# Patient Record
Sex: FEMALE | Race: WHITE | Employment: UNEMPLOYED | URBAN - METROPOLITAN AREA
[De-identification: names, ages, dates, MRNs, and addresses within clinical notes are randomized per-mention and may not be internally consistent; named-entity substitution may affect disease eponyms.]

---

## 2017-04-04 DIAGNOSIS — N18.1 CRF (CHRONIC RENAL FAILURE), STAGE 1: ICD-10-CM

## 2017-04-04 DIAGNOSIS — E78.1 HYPERTRIGLYCERIDEMIA: ICD-10-CM

## 2017-04-04 DIAGNOSIS — E11.65 TYPE 2 DIABETES MELLITUS WITH HYPERGLYCEMIA (HCC): ICD-10-CM

## 2017-04-04 DIAGNOSIS — E11.65 TYPE 2 DIABETES MELLITUS WITH HYPERGLYCEMIA, WITHOUT LONG-TERM CURRENT USE OF INSULIN (HCC): ICD-10-CM

## 2017-04-04 DIAGNOSIS — I10 ESSENTIAL HYPERTENSION: ICD-10-CM

## 2017-04-04 DIAGNOSIS — J45.30 MILD PERSISTENT ASTHMA WITHOUT COMPLICATION: ICD-10-CM

## 2017-04-04 RX ORDER — LISINOPRIL 40 MG/1
TABLET ORAL
Qty: 90 TABLET | Refills: 1 | Status: SHIPPED | OUTPATIENT
Start: 2017-04-04 | End: 2017-07-31 | Stop reason: SDUPTHER

## 2017-04-04 RX ORDER — SPIRONOLACTONE 25 MG/1
TABLET ORAL
Qty: 90 TABLET | Refills: 1 | Status: SHIPPED | OUTPATIENT
Start: 2017-04-04 | End: 2017-07-31 | Stop reason: SDUPTHER

## 2017-04-04 RX ORDER — SAXAGLIPTIN 5 MG/1
TABLET, FILM COATED ORAL
Qty: 30 TABLET | Refills: 4 | Status: SHIPPED | OUTPATIENT
Start: 2017-04-04 | End: 2017-07-31 | Stop reason: SDUPTHER

## 2017-07-31 ENCOUNTER — HOSPITAL ENCOUNTER (OUTPATIENT)
Age: 52
Setting detail: SPECIMEN
Discharge: HOME | End: 2017-07-31
Payer: COMMERCIAL

## 2017-07-31 ENCOUNTER — OFFICE VISIT (OUTPATIENT)
Dept: FAMILY MEDICINE CLINIC | Age: 52
End: 2017-07-31
Payer: COMMERCIAL

## 2017-07-31 VITALS
DIASTOLIC BLOOD PRESSURE: 82 MMHG | BODY MASS INDEX: 36.62 KG/M2 | HEIGHT: 62 IN | HEART RATE: 96 BPM | WEIGHT: 199 LBS | SYSTOLIC BLOOD PRESSURE: 123 MMHG | RESPIRATION RATE: 16 BRPM

## 2017-07-31 DIAGNOSIS — Z11.59 NEED FOR HEPATITIS C SCREENING TEST: ICD-10-CM

## 2017-07-31 DIAGNOSIS — Z12.11 SCREENING FOR COLON CANCER: ICD-10-CM

## 2017-07-31 DIAGNOSIS — E11.65 TYPE 2 DIABETES MELLITUS WITH HYPERGLYCEMIA, UNSPECIFIED LONG TERM INSULIN USE STATUS: ICD-10-CM

## 2017-07-31 DIAGNOSIS — E11.65 TYPE 2 DIABETES MELLITUS WITH HYPERGLYCEMIA, UNSPECIFIED LONG TERM INSULIN USE STATUS: Primary | ICD-10-CM

## 2017-07-31 DIAGNOSIS — Z12.31 SCREENING MAMMOGRAM, ENCOUNTER FOR: ICD-10-CM

## 2017-07-31 DIAGNOSIS — E11.65 TYPE 2 DIABETES MELLITUS WITH HYPERGLYCEMIA, WITHOUT LONG-TERM CURRENT USE OF INSULIN (HCC): ICD-10-CM

## 2017-07-31 DIAGNOSIS — E55.9 VITAMIN D DEFICIENCY: ICD-10-CM

## 2017-07-31 DIAGNOSIS — I10 ESSENTIAL HYPERTENSION: ICD-10-CM

## 2017-07-31 DIAGNOSIS — E78.1 HYPERTRIGLYCERIDEMIA: ICD-10-CM

## 2017-07-31 DIAGNOSIS — J45.40 MODERATE PERSISTENT ASTHMA WITHOUT COMPLICATION: ICD-10-CM

## 2017-07-31 DIAGNOSIS — Z13.820 SCREENING FOR OSTEOPOROSIS: ICD-10-CM

## 2017-07-31 DIAGNOSIS — Z11.4 SCREENING FOR HIV (HUMAN IMMUNODEFICIENCY VIRUS): ICD-10-CM

## 2017-07-31 DIAGNOSIS — J45.30 MILD PERSISTENT ASTHMA WITHOUT COMPLICATION: ICD-10-CM

## 2017-07-31 DIAGNOSIS — E78.2 MIXED HYPERLIPIDEMIA: ICD-10-CM

## 2017-07-31 DIAGNOSIS — N18.1 CRF (CHRONIC RENAL FAILURE), STAGE 1: ICD-10-CM

## 2017-07-31 DIAGNOSIS — N18.2 CRF (CHRONIC RENAL FAILURE), STAGE 2 (MILD): ICD-10-CM

## 2017-07-31 DIAGNOSIS — B37.9 CANDIDA INFECTION: ICD-10-CM

## 2017-07-31 LAB
CREATININE URINE: 66.8 MG/DL (ref 28–217)
HBA1C MFR BLD: 7.7 %
MICROALBUMIN/CREAT 24H UR: <12 MG/L
MICROALBUMIN/CREAT UR-RTO: 18 MCG/MG CREAT

## 2017-07-31 PROCEDURE — 99215 OFFICE O/P EST HI 40 MIN: CPT | Performed by: FAMILY MEDICINE

## 2017-07-31 PROCEDURE — 83036 HEMOGLOBIN GLYCOSYLATED A1C: CPT | Performed by: FAMILY MEDICINE

## 2017-07-31 RX ORDER — LINAGLIPTIN 5 MG/1
5 TABLET, FILM COATED ORAL DAILY
Qty: 90 TABLET | Refills: 1 | Status: SHIPPED | OUTPATIENT
Start: 2017-07-31 | End: 2018-02-12 | Stop reason: SDUPTHER

## 2017-07-31 RX ORDER — SPIRONOLACTONE 25 MG/1
25 TABLET ORAL DAILY
Qty: 90 TABLET | Refills: 1 | Status: SHIPPED | OUTPATIENT
Start: 2017-07-31 | End: 2018-05-01 | Stop reason: SDUPTHER

## 2017-07-31 RX ORDER — BUMETANIDE 1 MG/1
0.5 TABLET ORAL DAILY
Qty: 90 TABLET | Refills: 1 | Status: SHIPPED | OUTPATIENT
Start: 2017-07-31 | End: 2018-06-26 | Stop reason: SDUPTHER

## 2017-07-31 RX ORDER — DAPAGLIFLOZIN 10 MG/1
10 TABLET, FILM COATED ORAL EVERY MORNING
Qty: 90 TABLET | Refills: 1 | Status: SHIPPED | OUTPATIENT
Start: 2017-07-31 | End: 2018-02-12 | Stop reason: SDUPTHER

## 2017-07-31 RX ORDER — FENOFIBRATE 134 MG/1
134 CAPSULE ORAL
Qty: 90 CAPSULE | Refills: 1 | Status: SHIPPED | OUTPATIENT
Start: 2017-07-31 | End: 2018-01-27 | Stop reason: SDUPTHER

## 2017-07-31 RX ORDER — DULAGLUTIDE 1.5 MG/.5ML
1.5 INJECTION, SOLUTION SUBCUTANEOUS WEEKLY
Qty: 12 PEN | Refills: 1 | Status: SHIPPED | OUTPATIENT
Start: 2017-07-31 | End: 2018-01-09 | Stop reason: SDUPTHER

## 2017-07-31 RX ORDER — FLUCONAZOLE 150 MG/1
TABLET ORAL
Qty: 30 TABLET | Refills: 1 | Status: SHIPPED | OUTPATIENT
Start: 2017-07-31 | End: 2017-08-07 | Stop reason: SDUPTHER

## 2017-07-31 RX ORDER — LISINOPRIL 40 MG/1
40 TABLET ORAL DAILY
Qty: 90 TABLET | Refills: 1 | Status: SHIPPED | OUTPATIENT
Start: 2017-07-31 | End: 2018-05-01 | Stop reason: SDUPTHER

## 2017-08-07 ENCOUNTER — TELEPHONE (OUTPATIENT)
Dept: FAMILY MEDICINE CLINIC | Age: 52
End: 2017-08-07

## 2017-08-07 DIAGNOSIS — B37.9 CANDIDA INFECTION: ICD-10-CM

## 2017-08-07 RX ORDER — FLUCONAZOLE 150 MG/1
TABLET ORAL
Qty: 30 TABLET | Refills: 1 | Status: SHIPPED | OUTPATIENT
Start: 2017-08-07 | End: 2018-10-18

## 2017-08-11 DIAGNOSIS — E11.65 TYPE 2 DIABETES MELLITUS WITH HYPERGLYCEMIA, WITHOUT LONG-TERM CURRENT USE OF INSULIN (HCC): ICD-10-CM

## 2017-08-11 RX ORDER — DAPAGLIFLOZIN 10 MG/1
TABLET, FILM COATED ORAL
Qty: 90 TABLET | Refills: 0 | OUTPATIENT
Start: 2017-08-11

## 2017-08-12 LAB
CHOLESTEROL, TOTAL: 147 MG/DL
CHOLESTEROL/HDL RATIO: 6.4
HDLC SERPL-MCNC: 23 MG/DL (ref 35–70)
LDL CHOLESTEROL CALCULATED: 70 MG/DL (ref 0–160)
TRIGL SERPL-MCNC: 268 MG/DL
VLDLC SERPL CALC-MCNC: 54 MG/DL

## 2017-08-14 DIAGNOSIS — E78.2 MIXED HYPERLIPIDEMIA: ICD-10-CM

## 2017-08-14 DIAGNOSIS — Z11.4 SCREENING FOR HIV (HUMAN IMMUNODEFICIENCY VIRUS): ICD-10-CM

## 2017-08-14 DIAGNOSIS — J45.40 MODERATE PERSISTENT ASTHMA WITHOUT COMPLICATION: ICD-10-CM

## 2017-08-14 DIAGNOSIS — E78.1 HYPERTRIGLYCERIDEMIA: ICD-10-CM

## 2017-08-14 DIAGNOSIS — E11.65 TYPE 2 DIABETES MELLITUS WITH HYPERGLYCEMIA, UNSPECIFIED LONG TERM INSULIN USE STATUS: ICD-10-CM

## 2017-08-14 DIAGNOSIS — I10 ESSENTIAL HYPERTENSION: ICD-10-CM

## 2017-08-14 DIAGNOSIS — N18.2 CRF (CHRONIC RENAL FAILURE), STAGE 2 (MILD): ICD-10-CM

## 2017-08-14 DIAGNOSIS — E55.9 VITAMIN D DEFICIENCY: ICD-10-CM

## 2017-08-14 LAB
ALBUMIN SERPL-MCNC: NORMAL G/DL
ALP BLD-CCNC: NORMAL U/L
ALT SERPL-CCNC: NORMAL U/L
ANION GAP SERPL CALCULATED.3IONS-SCNC: NORMAL MMOL/L
AST SERPL-CCNC: NORMAL U/L
BASOPHILS ABSOLUTE: NORMAL /ΜL
BASOPHILS RELATIVE PERCENT: NORMAL %
BILIRUB SERPL-MCNC: NORMAL MG/DL (ref 0.1–1.4)
BUN BLDV-MCNC: NORMAL MG/DL
CALCIUM SERPL-MCNC: NORMAL MG/DL
CHLORIDE BLD-SCNC: NORMAL MMOL/L
CO2: NORMAL MMOL/L
CREAT SERPL-MCNC: NORMAL MG/DL
EOSINOPHILS ABSOLUTE: NORMAL /ΜL
EOSINOPHILS RELATIVE PERCENT: NORMAL %
GFR CALCULATED: NORMAL
GLUCOSE BLD-MCNC: NORMAL MG/DL
HCT VFR BLD CALC: NORMAL % (ref 36–46)
HEMOGLOBIN: NORMAL G/DL (ref 12–16)
LYMPHOCYTES ABSOLUTE: NORMAL /ΜL
LYMPHOCYTES RELATIVE PERCENT: NORMAL %
MCH RBC QN AUTO: NORMAL PG
MCHC RBC AUTO-ENTMCNC: NORMAL G/DL
MCV RBC AUTO: NORMAL FL
MONOCYTES ABSOLUTE: NORMAL /ΜL
MONOCYTES RELATIVE PERCENT: NORMAL %
NEUTROPHILS ABSOLUTE: NORMAL /ΜL
NEUTROPHILS RELATIVE PERCENT: NORMAL %
PDW BLD-RTO: NORMAL %
PLATELET # BLD: NORMAL K/ΜL
PMV BLD AUTO: NORMAL FL
POTASSIUM SERPL-SCNC: NORMAL MMOL/L
RBC # BLD: NORMAL 10^6/ΜL
SODIUM BLD-SCNC: NORMAL MMOL/L
T4 FREE: NORMAL
TOTAL PROTEIN: NORMAL
TSH SERPL DL<=0.05 MIU/L-ACNC: NORMAL UIU/ML
WBC # BLD: NORMAL 10^3/ML

## 2017-08-15 DIAGNOSIS — Z11.59 NEED FOR HEPATITIS C SCREENING TEST: ICD-10-CM

## 2018-01-29 RX ORDER — FENOFIBRATE 134 MG/1
CAPSULE ORAL
Qty: 90 CAPSULE | Refills: 1 | Status: SHIPPED | OUTPATIENT
Start: 2018-01-29 | End: 2018-07-28 | Stop reason: SDUPTHER

## 2018-05-19 DIAGNOSIS — E11.65 TYPE 2 DIABETES MELLITUS WITH HYPERGLYCEMIA, WITHOUT LONG-TERM CURRENT USE OF INSULIN (HCC): ICD-10-CM

## 2018-05-19 DIAGNOSIS — E11.65 TYPE 2 DIABETES MELLITUS WITH HYPERGLYCEMIA, UNSPECIFIED LONG TERM INSULIN USE STATUS: ICD-10-CM

## 2018-05-21 RX ORDER — DAPAGLIFLOZIN 10 MG/1
TABLET, FILM COATED ORAL
Qty: 90 TABLET | Refills: 0 | Status: SHIPPED | OUTPATIENT
Start: 2018-05-21 | End: 2018-08-03 | Stop reason: SDUPTHER

## 2018-06-15 DIAGNOSIS — E11.65 TYPE 2 DIABETES MELLITUS WITH HYPERGLYCEMIA, UNSPECIFIED LONG TERM INSULIN USE STATUS: ICD-10-CM

## 2018-06-15 DIAGNOSIS — E11.65 TYPE 2 DIABETES MELLITUS WITH HYPERGLYCEMIA, WITHOUT LONG-TERM CURRENT USE OF INSULIN (HCC): ICD-10-CM

## 2018-06-18 RX ORDER — LINAGLIPTIN 5 MG/1
TABLET, FILM COATED ORAL
Qty: 90 TABLET | Refills: 0 | Status: SHIPPED | OUTPATIENT
Start: 2018-06-18 | End: 2018-09-19 | Stop reason: SDUPTHER

## 2018-06-26 DIAGNOSIS — I10 ESSENTIAL HYPERTENSION: ICD-10-CM

## 2018-06-26 DIAGNOSIS — N18.1 CRF (CHRONIC RENAL FAILURE), STAGE 1: ICD-10-CM

## 2018-06-26 DIAGNOSIS — E78.1 HYPERTRIGLYCERIDEMIA: ICD-10-CM

## 2018-06-26 DIAGNOSIS — J45.30 MILD PERSISTENT ASTHMA WITHOUT COMPLICATION: ICD-10-CM

## 2018-06-26 RX ORDER — BUMETANIDE 1 MG/1
TABLET ORAL
Qty: 90 TABLET | Refills: 1 | Status: SHIPPED | OUTPATIENT
Start: 2018-06-26 | End: 2018-10-18 | Stop reason: SDUPTHER

## 2018-07-30 RX ORDER — FENOFIBRATE 134 MG/1
CAPSULE ORAL
Qty: 90 CAPSULE | Refills: 1 | Status: SHIPPED | OUTPATIENT
Start: 2018-07-30 | End: 2018-10-18 | Stop reason: SDUPTHER

## 2018-07-30 NOTE — TELEPHONE ENCOUNTER
Medication: Fenofibrate 134mg  Last visit: 07/31/17  Next visit: Visit date not found  Last refill: 01/29/18  Pharmacy: [facility]  PCP:           Homer Schulz DO

## 2018-08-03 DIAGNOSIS — E78.1 HYPERTRIGLYCERIDEMIA: ICD-10-CM

## 2018-08-03 DIAGNOSIS — J45.30 MILD PERSISTENT ASTHMA WITHOUT COMPLICATION: ICD-10-CM

## 2018-08-03 DIAGNOSIS — E11.65 TYPE 2 DIABETES MELLITUS WITH HYPERGLYCEMIA, WITHOUT LONG-TERM CURRENT USE OF INSULIN (HCC): ICD-10-CM

## 2018-08-03 DIAGNOSIS — N18.1 CRF (CHRONIC RENAL FAILURE), STAGE 1: ICD-10-CM

## 2018-08-03 DIAGNOSIS — E11.65 TYPE 2 DIABETES MELLITUS WITH HYPERGLYCEMIA, UNSPECIFIED LONG TERM INSULIN USE STATUS: ICD-10-CM

## 2018-08-03 DIAGNOSIS — I10 ESSENTIAL HYPERTENSION: ICD-10-CM

## 2018-08-03 RX ORDER — LISINOPRIL 40 MG/1
TABLET ORAL
Qty: 90 TABLET | Refills: 0 | Status: SHIPPED | OUTPATIENT
Start: 2018-08-03 | End: 2018-10-18 | Stop reason: SDUPTHER

## 2018-08-03 RX ORDER — SPIRONOLACTONE 25 MG/1
TABLET ORAL
Qty: 90 TABLET | Refills: 0 | Status: SHIPPED | OUTPATIENT
Start: 2018-08-03 | End: 2018-10-18 | Stop reason: SDUPTHER

## 2018-08-03 RX ORDER — DAPAGLIFLOZIN 10 MG/1
TABLET, FILM COATED ORAL
Qty: 90 TABLET | Refills: 0 | Status: SHIPPED | OUTPATIENT
Start: 2018-08-03 | End: 2018-10-18 | Stop reason: SDUPTHER

## 2018-08-03 RX ORDER — DULAGLUTIDE 1.5 MG/.5ML
1.5 INJECTION, SOLUTION SUBCUTANEOUS WEEKLY
Qty: 6 ML | Refills: 0 | Status: SHIPPED | OUTPATIENT
Start: 2018-08-03 | End: 2018-10-18 | Stop reason: SDUPTHER

## 2018-09-19 DIAGNOSIS — E11.65 TYPE 2 DIABETES MELLITUS WITH HYPERGLYCEMIA, WITHOUT LONG-TERM CURRENT USE OF INSULIN (HCC): ICD-10-CM

## 2018-09-19 DIAGNOSIS — E11.65 TYPE 2 DIABETES MELLITUS WITH HYPERGLYCEMIA (HCC): ICD-10-CM

## 2018-09-20 RX ORDER — LINAGLIPTIN 5 MG/1
TABLET, FILM COATED ORAL
Qty: 90 TABLET | Refills: 0 | Status: SHIPPED | OUTPATIENT
Start: 2018-09-20 | End: 2018-12-30 | Stop reason: SDUPTHER

## 2018-10-18 ENCOUNTER — OFFICE VISIT (OUTPATIENT)
Dept: FAMILY MEDICINE CLINIC | Age: 53
End: 2018-10-18
Payer: COMMERCIAL

## 2018-10-18 VITALS
SYSTOLIC BLOOD PRESSURE: 116 MMHG | HEIGHT: 62 IN | HEART RATE: 80 BPM | RESPIRATION RATE: 16 BRPM | BODY MASS INDEX: 32.94 KG/M2 | WEIGHT: 179 LBS | DIASTOLIC BLOOD PRESSURE: 71 MMHG

## 2018-10-18 DIAGNOSIS — E78.1 HYPERTRIGLYCERIDEMIA: ICD-10-CM

## 2018-10-18 DIAGNOSIS — Z12.11 SCREENING FOR COLON CANCER: ICD-10-CM

## 2018-10-18 DIAGNOSIS — I10 ESSENTIAL HYPERTENSION: ICD-10-CM

## 2018-10-18 DIAGNOSIS — Z23 NEED FOR INFLUENZA VACCINATION: ICD-10-CM

## 2018-10-18 DIAGNOSIS — J45.30 MILD PERSISTENT ASTHMA WITHOUT COMPLICATION: ICD-10-CM

## 2018-10-18 DIAGNOSIS — Z12.39 SCREENING FOR BREAST CANCER: ICD-10-CM

## 2018-10-18 DIAGNOSIS — Z23 NEEDS FLU SHOT: ICD-10-CM

## 2018-10-18 DIAGNOSIS — N18.1 CRF (CHRONIC RENAL FAILURE), STAGE 1: ICD-10-CM

## 2018-10-18 DIAGNOSIS — E11.65 TYPE 2 DIABETES MELLITUS WITH HYPERGLYCEMIA, WITHOUT LONG-TERM CURRENT USE OF INSULIN (HCC): Primary | ICD-10-CM

## 2018-10-18 LAB — HBA1C MFR BLD: 5.9 %

## 2018-10-18 PROCEDURE — 90471 IMMUNIZATION ADMIN: CPT | Performed by: FAMILY MEDICINE

## 2018-10-18 PROCEDURE — 83036 HEMOGLOBIN GLYCOSYLATED A1C: CPT | Performed by: FAMILY MEDICINE

## 2018-10-18 PROCEDURE — 99214 OFFICE O/P EST MOD 30 MIN: CPT | Performed by: FAMILY MEDICINE

## 2018-10-18 PROCEDURE — 90686 IIV4 VACC NO PRSV 0.5 ML IM: CPT | Performed by: FAMILY MEDICINE

## 2018-10-18 RX ORDER — BUMETANIDE 1 MG/1
TABLET ORAL
Qty: 90 TABLET | Refills: 1 | Status: SHIPPED | OUTPATIENT
Start: 2018-10-18 | End: 2020-02-25

## 2018-10-18 RX ORDER — DULAGLUTIDE 1.5 MG/.5ML
1.5 INJECTION, SOLUTION SUBCUTANEOUS WEEKLY
Qty: 6 ML | Refills: 3 | Status: SHIPPED | OUTPATIENT
Start: 2018-10-18 | End: 2020-01-21

## 2018-10-18 RX ORDER — FENOFIBRATE 134 MG/1
CAPSULE ORAL
Qty: 90 CAPSULE | Refills: 1 | Status: SHIPPED | OUTPATIENT
Start: 2018-10-18 | End: 2019-04-16 | Stop reason: SDUPTHER

## 2018-10-18 RX ORDER — LISINOPRIL 20 MG/1
TABLET ORAL
Qty: 90 TABLET | Refills: 3 | Status: SHIPPED | OUTPATIENT
Start: 2018-10-18 | End: 2019-06-06 | Stop reason: SINTOL

## 2018-10-18 RX ORDER — DAPAGLIFLOZIN 10 MG/1
TABLET, FILM COATED ORAL
Qty: 90 TABLET | Refills: 1 | Status: SHIPPED | OUTPATIENT
Start: 2018-10-18 | End: 2019-06-06

## 2018-10-18 RX ORDER — SPIRONOLACTONE 25 MG/1
TABLET ORAL
Qty: 90 TABLET | Refills: 1 | Status: SHIPPED | OUTPATIENT
Start: 2018-10-18 | End: 2019-05-24 | Stop reason: SDUPTHER

## 2018-10-18 RX ORDER — LISINOPRIL 40 MG/1
TABLET ORAL
Qty: 90 TABLET | Refills: 1 | Status: SHIPPED | OUTPATIENT
Start: 2018-10-18 | End: 2018-10-18 | Stop reason: SDUPTHER

## 2018-10-18 ASSESSMENT — PATIENT HEALTH QUESTIONNAIRE - PHQ9
1. LITTLE INTEREST OR PLEASURE IN DOING THINGS: 0
SUM OF ALL RESPONSES TO PHQ QUESTIONS 1-9: 0
SUM OF ALL RESPONSES TO PHQ QUESTIONS 1-9: 0
2. FEELING DOWN, DEPRESSED OR HOPELESS: 0
SUM OF ALL RESPONSES TO PHQ9 QUESTIONS 1 & 2: 0

## 2018-10-18 NOTE — PROGRESS NOTES
Encounter   Medications    TRULICITY 1.5 GS/2.8QI SOPN     Sig: Inject 1.5 mg into the skin once a week     Dispense:  6 mL     Refill:  3    FARXIGA 10 MG tablet     Sig: TAKE 1 TABLET EVERY MORNING     Dispense:  90 tablet     Refill:  1    lisinopril (PRINIVIL;ZESTRIL) 40 MG tablet     Sig: TAKE 1 TABLET DAILY     Dispense:  90 tablet     Refill:  1    spironolactone (ALDACTONE) 25 MG tablet     Sig: TAKE 1 TABLET DAILY     Dispense:  90 tablet     Refill:  1    fenofibrate micronized (LOFIBRA) 134 MG capsule     Sig: TAKE 1 CAPSULE EVERY MORNING BEFORE BREAKFAST     Dispense:  90 capsule     Refill:  1    bumetanide (BUMEX) 1 MG tablet     Sig: TAKE ONE-HALF (1/2) TABLET DAILY     Dispense:  90 tablet     Refill:  1       Electronically signed by Karma Stratton DO on 10/18/2018 at 11:18 AM

## 2018-12-30 DIAGNOSIS — E11.65 TYPE 2 DIABETES MELLITUS WITH HYPERGLYCEMIA (HCC): ICD-10-CM

## 2018-12-30 DIAGNOSIS — E11.65 TYPE 2 DIABETES MELLITUS WITH HYPERGLYCEMIA, WITHOUT LONG-TERM CURRENT USE OF INSULIN (HCC): ICD-10-CM

## 2018-12-31 RX ORDER — LINAGLIPTIN 5 MG/1
TABLET, FILM COATED ORAL
Qty: 90 TABLET | Refills: 0 | Status: SHIPPED | OUTPATIENT
Start: 2018-12-31 | End: 2019-03-26 | Stop reason: SDUPTHER

## 2019-03-26 DIAGNOSIS — E11.65 TYPE 2 DIABETES MELLITUS WITH HYPERGLYCEMIA (HCC): ICD-10-CM

## 2019-03-26 DIAGNOSIS — E11.65 TYPE 2 DIABETES MELLITUS WITH HYPERGLYCEMIA, WITHOUT LONG-TERM CURRENT USE OF INSULIN (HCC): ICD-10-CM

## 2019-03-26 RX ORDER — LINAGLIPTIN 5 MG/1
TABLET, FILM COATED ORAL
Qty: 90 TABLET | Refills: 0 | Status: SHIPPED | OUTPATIENT
Start: 2019-03-26 | End: 2019-06-06

## 2019-04-16 DIAGNOSIS — J45.30 MILD PERSISTENT ASTHMA WITHOUT COMPLICATION: ICD-10-CM

## 2019-04-16 DIAGNOSIS — E78.1 HYPERTRIGLYCERIDEMIA: ICD-10-CM

## 2019-04-18 RX ORDER — FENOFIBRATE 134 MG/1
CAPSULE ORAL
Qty: 90 CAPSULE | Refills: 1 | Status: SHIPPED | OUTPATIENT
Start: 2019-04-18 | End: 2019-10-13 | Stop reason: SDUPTHER

## 2019-05-24 DIAGNOSIS — N18.1 CRF (CHRONIC RENAL FAILURE), STAGE 1: ICD-10-CM

## 2019-05-24 DIAGNOSIS — I10 ESSENTIAL HYPERTENSION: ICD-10-CM

## 2019-05-24 DIAGNOSIS — J45.30 MILD PERSISTENT ASTHMA WITHOUT COMPLICATION: ICD-10-CM

## 2019-05-24 RX ORDER — SPIRONOLACTONE 25 MG/1
TABLET ORAL
Qty: 90 TABLET | Refills: 1 | Status: SHIPPED | OUTPATIENT
Start: 2019-05-24 | End: 2020-01-21

## 2019-06-06 ENCOUNTER — OFFICE VISIT (OUTPATIENT)
Dept: FAMILY MEDICINE CLINIC | Age: 54
End: 2019-06-06
Payer: COMMERCIAL

## 2019-06-06 VITALS
SYSTOLIC BLOOD PRESSURE: 133 MMHG | BODY MASS INDEX: 30.55 KG/M2 | WEIGHT: 166 LBS | HEIGHT: 62 IN | RESPIRATION RATE: 16 BRPM | HEART RATE: 97 BPM | DIASTOLIC BLOOD PRESSURE: 76 MMHG

## 2019-06-06 DIAGNOSIS — E11.65 TYPE 2 DIABETES MELLITUS WITH HYPERGLYCEMIA, WITHOUT LONG-TERM CURRENT USE OF INSULIN (HCC): Primary | ICD-10-CM

## 2019-06-06 DIAGNOSIS — Z23 NEED FOR TDAP VACCINATION: ICD-10-CM

## 2019-06-06 DIAGNOSIS — J45.30 MILD PERSISTENT ASTHMA WITHOUT COMPLICATION: ICD-10-CM

## 2019-06-06 DIAGNOSIS — Z12.11 ENCOUNTER FOR SCREENING COLONOSCOPY: ICD-10-CM

## 2019-06-06 DIAGNOSIS — Z12.39 SCREENING FOR BREAST CANCER: ICD-10-CM

## 2019-06-06 DIAGNOSIS — Z12.11 SCREENING FOR COLON CANCER: ICD-10-CM

## 2019-06-06 DIAGNOSIS — Z23 NEED FOR VACCINATION WITH 13-POLYVALENT PNEUMOCOCCAL CONJUGATE VACCINE: ICD-10-CM

## 2019-06-06 DIAGNOSIS — N18.1 CRF (CHRONIC RENAL FAILURE), STAGE 1: ICD-10-CM

## 2019-06-06 DIAGNOSIS — E78.1 HYPERTRIGLYCERIDEMIA: ICD-10-CM

## 2019-06-06 DIAGNOSIS — I10 ESSENTIAL HYPERTENSION: ICD-10-CM

## 2019-06-06 DIAGNOSIS — M79.641 HAND PAIN, RIGHT: ICD-10-CM

## 2019-06-06 LAB — HBA1C MFR BLD: 5.5 %

## 2019-06-06 PROCEDURE — 83036 HEMOGLOBIN GLYCOSYLATED A1C: CPT | Performed by: FAMILY MEDICINE

## 2019-06-06 PROCEDURE — 90670 PCV13 VACCINE IM: CPT | Performed by: FAMILY MEDICINE

## 2019-06-06 PROCEDURE — 90471 IMMUNIZATION ADMIN: CPT | Performed by: FAMILY MEDICINE

## 2019-06-06 PROCEDURE — 90472 IMMUNIZATION ADMIN EACH ADD: CPT | Performed by: FAMILY MEDICINE

## 2019-06-06 PROCEDURE — 90715 TDAP VACCINE 7 YRS/> IM: CPT | Performed by: FAMILY MEDICINE

## 2019-06-06 PROCEDURE — 99214 OFFICE O/P EST MOD 30 MIN: CPT | Performed by: FAMILY MEDICINE

## 2019-06-06 RX ORDER — LISINOPRIL 10 MG/1
10 TABLET ORAL DAILY
Qty: 90 TABLET | Refills: 3 | Status: SHIPPED | OUTPATIENT
Start: 2019-06-06 | End: 2020-08-10

## 2019-06-06 ASSESSMENT — PATIENT HEALTH QUESTIONNAIRE - PHQ9
SUM OF ALL RESPONSES TO PHQ9 QUESTIONS 1 & 2: 0
1. LITTLE INTEREST OR PLEASURE IN DOING THINGS: 0
SUM OF ALL RESPONSES TO PHQ QUESTIONS 1-9: 0
2. FEELING DOWN, DEPRESSED OR HOPELESS: 0
SUM OF ALL RESPONSES TO PHQ QUESTIONS 1-9: 0

## 2019-06-06 NOTE — PROGRESS NOTES
Allergic/Immunologic: Negative. Neurological: Negative. Negative for dizziness, weakness and numbness. Hematological: Negative. Negative for adenopathy. Does not bruise/bleed easily. Psychiatric/Behavioral: Negative for sleep disturbance, dysphoric mood and  decreased concentration. The patient is not nervous/anxious. Objective:     Physical Exam:     Nursing note and vitals reviewed. /76   Pulse 97   Resp 16   Ht 5' 1.81\" (1.57 m)   Wt 166 lb (75.3 kg)   Breastfeeding? No   BMI 30.55 kg/m²   Constitutional: She is oriented to person, place, and time. She   appears well-developed and well-nourished. HENT:   Head: Normocephalic and atraumatic. Right Ear: External ear normal. Tympanic membrane is not erythematous. No middle ear effusion. Left Ear: External ear normal. Tympanic membrane is not erythematous. No middle ear effusion. Nose: No mucosal edema. Mouth/Throat: Oropharynx is clear and moist. No posterior oropharyngeal erythema. Eyes: Conjunctivae and EOM are normal. Pupils are equal, round, and reactive to light. Neck: Normal range of motion. Neck supple. No thyromegaly present. Cardiovascular: Normal rate, regular rhythm and normal heart sounds. No murmur heard. Pulmonary/Chest: Effort normal and breath sounds normal. She has no wheezes. Shehas no rales. Abdominal: Soft. Bowel sounds are normal. She exhibits no distension and no mass. There is no tenderness. There is no rebound and no guarding. Genitourinary/Anorectal:deferred  Musculoskeletal: Normal range of motion. She exhibits no edema or tenderness. Lymphadenopathy: She has no cervical adenopathy. Neurological: She is alert and oriented to person, place, and time. She has normal reflexes. Skin: Skin is warm and dry. No rash noted. Psychiatric: She has a normal mood and affect. Her   behavior is normal.       Assessment:      1.  Type 2 diabetes mellitus with hyperglycemia, without long-term lisinopril to 10 mg follow-up in the office in 6 months or when necessary given a T dap and a Prevnar 13    Electronically signed by Patience Crowley DO on 6/6/2019 at 4:31 PM

## 2019-10-13 DIAGNOSIS — E78.1 HYPERTRIGLYCERIDEMIA: ICD-10-CM

## 2019-10-13 DIAGNOSIS — J45.30 MILD PERSISTENT ASTHMA WITHOUT COMPLICATION: ICD-10-CM

## 2019-10-14 RX ORDER — FENOFIBRATE 134 MG/1
CAPSULE ORAL
Qty: 90 CAPSULE | Refills: 4 | Status: SHIPPED | OUTPATIENT
Start: 2019-10-14 | End: 2020-10-20

## 2020-01-21 RX ORDER — DULAGLUTIDE 1.5 MG/.5ML
INJECTION, SOLUTION SUBCUTANEOUS
Qty: 6 ML | Refills: 4 | Status: SHIPPED | OUTPATIENT
Start: 2020-01-21

## 2020-01-21 RX ORDER — SPIRONOLACTONE 25 MG/1
TABLET ORAL
Qty: 90 TABLET | Refills: 4 | Status: SHIPPED | OUTPATIENT
Start: 2020-01-21 | End: 2021-01-25

## 2020-01-23 ENCOUNTER — OFFICE VISIT (OUTPATIENT)
Dept: FAMILY MEDICINE CLINIC | Age: 55
End: 2020-01-23
Payer: COMMERCIAL

## 2020-01-23 VITALS
WEIGHT: 180.4 LBS | SYSTOLIC BLOOD PRESSURE: 137 MMHG | BODY MASS INDEX: 33.2 KG/M2 | HEIGHT: 62 IN | DIASTOLIC BLOOD PRESSURE: 85 MMHG | HEART RATE: 90 BPM | OXYGEN SATURATION: 97 %

## 2020-01-23 LAB
BILIRUBIN, POC: NORMAL
BLOOD URINE, POC: NORMAL
CLARITY, POC: CLEAR
COLOR, POC: YELLOW
CREATININE URINE POCT: 300
GLUCOSE URINE, POC: 500
HBA1C MFR BLD: 5.9 %
KETONES, POC: NORMAL
LEUKOCYTE EST, POC: NORMAL
MICROALBUMIN/CREAT 24H UR: 80 MG/G{CREAT}
MICROALBUMIN/CREAT UR-RTO: NORMAL
NITRITE, POC: NORMAL
PH, POC: 5.5
PROTEIN, POC: NORMAL
SPECIFIC GRAVITY, POC: >=1.03
UROBILINOGEN, POC: 0.2

## 2020-01-23 PROCEDURE — 90471 IMMUNIZATION ADMIN: CPT | Performed by: FAMILY MEDICINE

## 2020-01-23 PROCEDURE — 99214 OFFICE O/P EST MOD 30 MIN: CPT | Performed by: FAMILY MEDICINE

## 2020-01-23 PROCEDURE — 81002 URINALYSIS NONAUTO W/O SCOPE: CPT | Performed by: FAMILY MEDICINE

## 2020-01-23 PROCEDURE — 82044 UR ALBUMIN SEMIQUANTITATIVE: CPT | Performed by: FAMILY MEDICINE

## 2020-01-23 PROCEDURE — 90686 IIV4 VACC NO PRSV 0.5 ML IM: CPT | Performed by: FAMILY MEDICINE

## 2020-01-23 PROCEDURE — 83036 HEMOGLOBIN GLYCOSYLATED A1C: CPT | Performed by: FAMILY MEDICINE

## 2020-01-23 ASSESSMENT — PATIENT HEALTH QUESTIONNAIRE - PHQ9
SUM OF ALL RESPONSES TO PHQ9 QUESTIONS 1 & 2: 0
SUM OF ALL RESPONSES TO PHQ QUESTIONS 1-9: 0
2. FEELING DOWN, DEPRESSED OR HOPELESS: 0
SUM OF ALL RESPONSES TO PHQ QUESTIONS 1-9: 0
1. LITTLE INTEREST OR PLEASURE IN DOING THINGS: 0

## 2020-01-23 NOTE — PROGRESS NOTES
Test 2-3 times daily. Please dispense Entitlecan test strips #200 3 refills 200 each 3    ONETOUCH VERIO strip 1 each by In Vitro route daily As needed. 100 each 3    albuterol sulfate HFA (PROVENTIL HFA) 108 (90 BASE) MCG/ACT inhaler Inhale 2 puffs into the lungs every 6 hours as needed for Wheezing 3 Inhaler 3     No current facility-administered medications for this visit. ALLERGIES:  No Known Allergies    Social History     Tobacco Use    Smoking status: Current Every Day Smoker     Packs/day: 0.80     Years: 30.00     Pack years: 24.00     Types: Cigarettes    Smokeless tobacco: Never Used   Substance Use Topics    Alcohol use: No        LDL Calculated (mg/dL)   Date Value   08/12/2017 70     LDL Cholesterol (mg/dL)   Date Value   03/16/2013 68     HDL (mg/dL)   Date Value   08/12/2017 23 (A)     BUN (mg/dL)   Date Value   03/16/2013 24 (H)     CREATININE (mg/dL)   Date Value   03/16/2013 1.42 (H)     Glucose (mg/dL)   Date Value   12/22/2015 597   04/14/2012 105     Hemoglobin A1C (%)   Date Value   01/23/2020 5.9     Microalb/Crt. Ratio (mcg/mg creat)   Date Value   07/31/2017 18              Subjective:      HPI  Today for follow-up on her diabetes she is doing great her A1c today is 5.9. Unfortunately she did gain a little bit of weight back but overall she is doing good she is due for laboratory studies and needs refills    Review of Systems:     Constitutional: Negative for fever, appetite change and fatigue. Family social and medical history reviewed and unchanged     HENT: Negative. Negative for nosebleeds, trouble swallowing and neck pain. Eyes: Negative for photophobia and visual disturbance. Respiratory: Negative. Negative for chest tightness and shortness of breath. Cardiovascular: Negative. Negative for chest pain and leg swelling. Gastrointestinal: Negative. Negative for abdominal pain and blood in stool. Endocrine: Negative for cold intolerance and polyuria. Genitourinary: Negative for dysuria and hematuria. Musculoskeletal: Negative. Skin: Negative for rash. Allergic/Immunologic: Negative. Neurological: Negative. Negative for dizziness, weakness and numbness. Hematological: Negative. Negative for adenopathy. Does not bruise/bleed easily. Psychiatric/Behavioral: Negative for sleep disturbance, dysphoric mood and  decreased concentration. The patient is not nervous/anxious. Objective:     Physical Exam:     Nursing note and vitals reviewed. /85   Pulse 90   Ht 5' 1.75\" (1.568 m)   Wt 180 lb 6.4 oz (81.8 kg)   SpO2 97%   BMI 33.26 kg/m²   Constitutional: She is oriented to person, place, and time. She   appears well-developed and well-nourished. HENT:   Head: Normocephalic and atraumatic. Right Ear: External ear normal. Tympanic membrane is not erythematous. No middle ear effusion. Left Ear: External ear normal. Tympanic membrane is not erythematous. No middle ear effusion. Nose: No mucosal edema. Mouth/Throat: Oropharynx is clear and moist. No posterior oropharyngeal erythema. Eyes: Conjunctivae and EOM are normal. Pupils are equal, round, and reactive to light. Neck: Normal range of motion. Neck supple. No thyromegaly present. Cardiovascular: Normal rate, regular rhythm and normal heart sounds. No murmur heard. Pulmonary/Chest: Effort normal and breath sounds normal. She has no wheezes. Shehas no rales. Abdominal: Soft. Bowel sounds are normal. She exhibits no distension and no mass. There is no tenderness. There is no rebound and no guarding. Genitourinary/Anorectal:deferred  Musculoskeletal: Normal range of motion. She exhibits no edema or tenderness. Lymphadenopathy: She has no cervical adenopathy. Neurological: She is alert and oriented to person, place, and time. She has normal reflexes. Skin: Skin is warm and dry. No rash noted. Psychiatric: She has a normal mood and affect.  Her   behavior is normal.       Assessment:      1. Type 2 diabetes mellitus with hyperglycemia, unspecified whether long term insulin use ([facility])    2. Screening mammogram, encounter for    3. Type 2 diabetes mellitus with hyperglycemia, without long-term current use of insulin ([facility] [address]    4. Mild persistent asthma without complication    5. Essential hypertension    6. Hypertriglyceridemia    7. Screening for colon cancer    8. Well adult exam    9. Carpal tunnel syndrome on right    10. Need for influenza vaccination          Plan:      Call or return to clinic prn if these symptoms worsen or fail to improve as anticipated. I have reviewed the instructions with the patient, answering all questions to her satisfaction. No follow-ups on file. Orders Placed This Encounter   Procedures    Cologuard (For External Results Only)     This test is performed by an external laboratory and is used for result attachment only. It is required that this order requisition be faxed to: [facility] @ [phone]. See [email or web address] for further information.      Standing Status:   Future     Standing Expiration Date:   1/23/2021    EUSEBIO DIGITAL SCREEN W OR WO CAD BILATERAL     Standing Status:   Future     Standing Expiration Date:   3/23/2021     Order Specific Question:   Reason for exam:     Answer:   screen    INFLUENZA, QUADV, 3 YRS AND OLDER, IM PF, PREFILL SYR OR SDV, 0.5ML (AFLURIA QUADV, PF)    CBC Auto Differential     Standing Status:   Future     Standing Expiration Date:   7/23/2020    Comprehensive Metabolic Panel     Standing Status:   Future     Standing Expiration Date:   7/23/2020    Lipid Panel     Standing Status:   Future     Standing Expiration Date:   7/23/2020     Order Specific Question:   Is Patient Fasting?/# of Hours     Answer:   yes    T4, Free     Standing Status:   Future     Standing Expiration Date:   7/23/2020    Thyroid Peroxidase Antibody     Standing Status:   Future     Standing Expiration Date:   2020    TSH without Reflex     Standing Status:   Future     Standing Expiration Date:   2020    Vitamin D 25 Hydroxy     Standing Status:   Future     Standing Expiration Date:   2021    POCT glycosylated hemoglobin (Hb A1C)    POCT Urinalysis no Micro    POCT microalbumin     Orders Placed This Encounter   Medications    Elastic Bandages & Supports (WRIST SPLINT/COCK-UP/RIGHT L) MISC     Si Units by Does not apply route nightly     Dispense:  1 each     Refill:  0       Electronically signed by Ata Maria DO on 2020 at 10:22 AM

## 2020-02-25 LAB
ALBUMIN SERPL-MCNC: NORMAL G/DL
ALP BLD-CCNC: NORMAL U/L
ALT SERPL-CCNC: NORMAL U/L
ANION GAP SERPL CALCULATED.3IONS-SCNC: NORMAL MMOL/L
AST SERPL-CCNC: NORMAL U/L
BASOPHILS ABSOLUTE: NORMAL
BASOPHILS RELATIVE PERCENT: NORMAL
BILIRUB SERPL-MCNC: NORMAL MG/DL
BUN BLDV-MCNC: NORMAL MG/DL
CALCIUM SERPL-MCNC: NORMAL MG/DL
CHLORIDE BLD-SCNC: NORMAL MMOL/L
CHOLESTEROL, TOTAL: 154 MG/DL
CHOLESTEROL/HDL RATIO: 4.8
CO2: NORMAL
CREAT SERPL-MCNC: NORMAL MG/DL
EOSINOPHILS ABSOLUTE: NORMAL
EOSINOPHILS RELATIVE PERCENT: NORMAL
GFR CALCULATED: NORMAL
GLUCOSE BLD-MCNC: NORMAL MG/DL
HCT VFR BLD CALC: NORMAL %
HDLC SERPL-MCNC: 32 MG/DL (ref 35–70)
HEMOGLOBIN: NORMAL
LDL CHOLESTEROL CALCULATED: 97 MG/DL (ref 0–160)
LYMPHOCYTES ABSOLUTE: NORMAL
LYMPHOCYTES RELATIVE PERCENT: NORMAL
MCH RBC QN AUTO: NORMAL PG
MCHC RBC AUTO-ENTMCNC: NORMAL G/DL
MCV RBC AUTO: NORMAL FL
MONOCYTES ABSOLUTE: NORMAL
MONOCYTES RELATIVE PERCENT: NORMAL
NEUTROPHILS ABSOLUTE: NORMAL
NEUTROPHILS RELATIVE PERCENT: NORMAL
PDW BLD-RTO: NORMAL %
PLATELET # BLD: NORMAL 10*3/UL
PMV BLD AUTO: NORMAL FL
POTASSIUM SERPL-SCNC: NORMAL MMOL/L
RBC # BLD: NORMAL 10*6/UL
SODIUM BLD-SCNC: NORMAL MMOL/L
T4 FREE: NORMAL
TOTAL PROTEIN: NORMAL
TRIGL SERPL-MCNC: 125 MG/DL
TSH SERPL DL<=0.05 MIU/L-ACNC: NORMAL M[IU]/L
VITAMIN D 25-HYDROXY: NORMAL
VITAMIN D2, 25 HYDROXY: NORMAL
VITAMIN D3,25 HYDROXY: NORMAL
VLDLC SERPL CALC-MCNC: 25 MG/DL
WBC # BLD: NORMAL 10*3/UL

## 2020-02-25 RX ORDER — BUMETANIDE 1 MG/1
TABLET ORAL
Qty: 90 TABLET | Refills: 3 | Status: SHIPPED | OUTPATIENT
Start: 2020-02-25 | End: 2020-06-19 | Stop reason: SDUPTHER

## 2020-06-22 RX ORDER — FLUCONAZOLE 150 MG/1
TABLET ORAL
Qty: 30 TABLET | Refills: 1 | Status: SHIPPED | OUTPATIENT
Start: 2020-06-22

## 2020-06-22 RX ORDER — BUMETANIDE 1 MG/1
TABLET ORAL
Qty: 90 TABLET | Refills: 3 | Status: SHIPPED | OUTPATIENT
Start: 2020-06-22 | End: 2021-08-04 | Stop reason: SDUPTHER

## 2020-10-19 NOTE — TELEPHONE ENCOUNTER
Becky Baird is calling to request a refill on the following medication(s):    Last Visit Date (If Applicable):  7/60/4488    Next Visit Date:    Visit date not found    Medication Request:  Requested Prescriptions     Pending Prescriptions Disp Refills    lisinopril (PRINIVIL;ZESTRIL) 10 MG tablet [Pharmacy Med Name: LISINOPRIL TABS 10MG] 90 tablet 3     Sig: TAKE 1 TABLET DAILY    fenofibrate micronized (LOFIBRA) 134 MG capsule [Pharmacy Med Name: FENOFIBRATE CAPS 134MG] 90 capsule 3     Sig: TAKE 1 CAPSULE EVERY MORNING BEFORE BREAKFAST    diclofenac sodium (VOLTAREN) 1 % GEL [Pharmacy Med Name: DICLOFENAC SODIUM GEL 100GM 1%] 500 g 10     Sig: APPLY 4 GRAMS TOPICALLY FOUR TIMES A DAY

## 2020-10-20 RX ORDER — FENOFIBRATE 134 MG/1
CAPSULE ORAL
Qty: 90 CAPSULE | Refills: 3 | Status: SHIPPED | OUTPATIENT
Start: 2020-10-20 | End: 2021-11-15 | Stop reason: SDUPTHER

## 2020-10-20 RX ORDER — LISINOPRIL 10 MG/1
TABLET ORAL
Qty: 90 TABLET | Refills: 3 | Status: SHIPPED | OUTPATIENT
Start: 2020-10-20 | End: 2021-11-15 | Stop reason: SDUPTHER

## 2020-11-03 PROBLEM — I10 HYPERTENSION: Status: RESOLVED | Noted: 2020-11-03 | Resolved: 2020-11-03

## 2021-02-08 ENCOUNTER — OFFICE VISIT (OUTPATIENT)
Dept: FAMILY MEDICINE CLINIC | Age: 56
End: 2021-02-08
Payer: COMMERCIAL

## 2021-02-08 VITALS
HEART RATE: 97 BPM | TEMPERATURE: 96.9 F | DIASTOLIC BLOOD PRESSURE: 78 MMHG | OXYGEN SATURATION: 98 % | BODY MASS INDEX: 35.48 KG/M2 | HEIGHT: 62 IN | SYSTOLIC BLOOD PRESSURE: 119 MMHG | WEIGHT: 192.8 LBS

## 2021-02-08 DIAGNOSIS — Z12.31 SCREENING MAMMOGRAM, ENCOUNTER FOR: ICD-10-CM

## 2021-02-08 DIAGNOSIS — N18.1 CRF (CHRONIC RENAL FAILURE), STAGE 1: ICD-10-CM

## 2021-02-08 DIAGNOSIS — I10 ESSENTIAL HYPERTENSION: ICD-10-CM

## 2021-02-08 DIAGNOSIS — J45.30 MILD PERSISTENT ASTHMA WITHOUT COMPLICATION: ICD-10-CM

## 2021-02-08 DIAGNOSIS — E11.65 TYPE 2 DIABETES MELLITUS WITH HYPERGLYCEMIA, WITHOUT LONG-TERM CURRENT USE OF INSULIN (HCC): Primary | ICD-10-CM

## 2021-02-08 DIAGNOSIS — E55.9 VITAMIN D DEFICIENCY: ICD-10-CM

## 2021-02-08 DIAGNOSIS — Z00.00 WELL ADULT EXAM: ICD-10-CM

## 2021-02-08 DIAGNOSIS — E78.1 HYPERTRIGLYCERIDEMIA: ICD-10-CM

## 2021-02-08 LAB — HBA1C MFR BLD: 9.3 %

## 2021-02-08 PROCEDURE — 83036 HEMOGLOBIN GLYCOSYLATED A1C: CPT | Performed by: FAMILY MEDICINE

## 2021-02-08 PROCEDURE — 99214 OFFICE O/P EST MOD 30 MIN: CPT | Performed by: FAMILY MEDICINE

## 2021-02-08 RX ORDER — BLOOD-GLUCOSE METER
1 KIT MISCELLANEOUS DAILY
Qty: 1 KIT | Refills: 0 | Status: SHIPPED | OUTPATIENT
Start: 2021-02-08 | End: 2021-02-16 | Stop reason: SDUPTHER

## 2021-02-08 RX ORDER — LANCETS 30 GAUGE
1 EACH MISCELLANEOUS 2 TIMES DAILY
Qty: 300 EACH | Refills: 5 | Status: SHIPPED | OUTPATIENT
Start: 2021-02-08 | End: 2021-02-16 | Stop reason: SDUPTHER

## 2021-02-08 RX ORDER — DULAGLUTIDE 4.5 MG/.5ML
4.5 INJECTION, SOLUTION SUBCUTANEOUS WEEKLY
Qty: 12 PEN | Refills: 0 | Status: SHIPPED | OUTPATIENT
Start: 2021-02-08 | End: 2021-04-27

## 2021-02-08 RX ORDER — GLUCOSAMINE HCL/CHONDROITIN SU 500-400 MG
CAPSULE ORAL
Qty: 300 STRIP | Refills: 0 | Status: SHIPPED | OUTPATIENT
Start: 2021-02-08 | End: 2021-02-16 | Stop reason: SDUPTHER

## 2021-02-08 SDOH — ECONOMIC STABILITY: TRANSPORTATION INSECURITY
IN THE PAST 12 MONTHS, HAS LACK OF TRANSPORTATION KEPT YOU FROM MEETINGS, WORK, OR FROM GETTING THINGS NEEDED FOR DAILY LIVING?: NOT ASKED

## 2021-02-08 SDOH — ECONOMIC STABILITY: FOOD INSECURITY: WITHIN THE PAST 12 MONTHS, THE FOOD YOU BOUGHT JUST DIDN'T LAST AND YOU DIDN'T HAVE MONEY TO GET MORE.: NEVER TRUE

## 2021-02-08 SDOH — ECONOMIC STABILITY: FOOD INSECURITY: WITHIN THE PAST 12 MONTHS, YOU WORRIED THAT YOUR FOOD WOULD RUN OUT BEFORE YOU GOT MONEY TO BUY MORE.: NEVER TRUE

## 2021-02-08 SDOH — ECONOMIC STABILITY: TRANSPORTATION INSECURITY
IN THE PAST 12 MONTHS, HAS THE LACK OF TRANSPORTATION KEPT YOU FROM MEDICAL APPOINTMENTS OR FROM GETTING MEDICATIONS?: NOT ASKED

## 2021-02-08 ASSESSMENT — PATIENT HEALTH QUESTIONNAIRE - PHQ9
SUM OF ALL RESPONSES TO PHQ9 QUESTIONS 1 & 2: 0
1. LITTLE INTEREST OR PLEASURE IN DOING THINGS: 0
2. FEELING DOWN, DEPRESSED OR HOPELESS: 0
SUM OF ALL RESPONSES TO PHQ QUESTIONS 1-9: 0
SUM OF ALL RESPONSES TO PHQ QUESTIONS 1-9: 0

## 2021-02-08 NOTE — PROGRESS NOTES
[facility]  [address] [place] [address] Dr  [address] [address]  Dept: [phone]      Bryce Brooke is a 61 y.o. female who presents today for follow up on her  medical conditions as noted below. Chief Complaint   Patient presents with    Leg Swelling     right calf     Other     Spot on nose, bilateral wrist pain        Patient Active Problem List:     Hyperlipidemia     DM (diabetes mellitus) ([address]     Hypertriglyceridemia     CRF (chronic renal failure)     Asthma     HTN (hypertension)     Past Medical History:   Diagnosis Date    DM [facility]di[facility] mellitus) ([facility] [address]     Hyperlipidemia     Hypertension       Past Surgical History:   Procedure Laterality Date    BLADDER SURGERY       SECTION      x2    TONSILLECTOMY       Family History   Problem Relation Age of Onset    Other Mother         sleep apnea    Cancer Father     Kidney Disease Sister     Other Sister         obesity       Current Outpatient Medications   Medication Sig Dispense Refill    Elastic Bandages & Supports (WRIST SPLINT/COCK-UP/LEFT L) MISC 1 Device by Does not apply route nightly Size to fit 1 each 0    TRULICITY 4.5 BS/5.5LU SOPN Inject 4.5 mg into the skin once a week 12 pen 0    blood glucose monitor strips Test 2 times a day & as needed for symptoms of irregular blood glucose. Dispense sufficient amount for indicated testing frequency plus additional to accommodate PRN testing needs.  300 strip 0    Lancets MISC 1 each by Does not apply route 2 times daily 300 each 5    glucose monitoring kit (FREESTYLE) monitoring kit 1 kit by Does not apply route daily 1 kit 0    spironolactone (ALDACTONE) 25 MG tablet TAKE 1 TABLET DAILY 90 tablet 0    lisinopril (PRINIVIL;ZESTRIL) 10 MG tablet TAKE 1 TABLET DAILY 90 tablet 3    fenofibrate micronized (LOFIBRA) 134 MG capsule TAKE 1 CAPSULE EVERY MORNING BEFORE BREAKFAST 90 capsule 3  diclofenac sodium (VOLTAREN) 1 % GEL APPLY 4 GRAMS TOPICALLY FOUR TIMES A  g 10    bumetanide (BUMEX) 1 MG tablet TAKE oneTABLET DAILY 90 tablet 3    fluconazole (DIFLUCAN) 150 MG tablet May repeat in 3-5 days, if symptoms persist or recur. 30 tablet 1    TRULICITY 1.5 OY/6.1EA SOPN INJECT 1.5   MG UNDER THE SKIN ONCE A WEEK 6 mL 4    albuterol sulfate (PROAIR RESPICLICK) 235 (90 Base) MCG/ACT aerosol powder inhalation Inhale 2 puffs into the lungs every 6 hours as needed for Wheezing or Shortness of Breath 3 Inhaler 1    albuterol sulfate HFA (PROVENTIL HFA) 108 (90 BASE) MCG/ACT inhaler Inhale 2 puffs into the lungs every 6 hours as needed for Wheezing 3 Inhaler 3    Elastic Bandages & Supports (WRIST SPLINT/COCK-UP/RIGHT L) MISC 1 Units by Does not apply route nightly (Patient not taking: Reported on 2/8/2021) 1 each 0    LifeScan [place] II Lancets MISC Test 2-3 times daily. Please dispense lifescan test strips #200 3 refills (Patient not taking: Reported on 2/8/2021) 200 each 3    ONETOUCH VERIO strip 1 each by In Vitro route daily As needed. (Patient not taking: Reported on 2/8/2021) 100 each 3     No current facility-administered medications for this visit. ALLERGIES:  No Known Allergies    Social History     Tobacco Use    Smoking status: Current Every Day Smoker     Packs/day: 0.80     Years: 30.00     Pack years: 24.00     Types: Cigarettes    Smokeless tobacco: Never Used   Substance Use Topics    Alcohol use: No        LDL Calculated (mg/dL)   Date Value   02/25/2020 97     LDL Cholesterol (mg/dL)   Date Value   03/16/2013 68     HDL (mg/dL)   Date Value   02/25/2020 32 (A)     BUN (mg/dL)   Date Value   03/16/2013 24 (H)     CREATININE (mg/dL)   Date Value   03/16/2013 1.42 (H)     Glucose (mg/dL)   Date Value   12/22/2015 597   04/14/2012 105     Hemoglobin A1C (%)   Date Value   01/23/2020 5.9     Microalb/Crt.  Ratio (mcg/mg creat)   Date Value   07/31/2017 18 Constitutional: She is oriented to person, place, and time. She   appears well-developed and well-nourished. HENT:   Head: Normocephalic and atraumatic. Right Ear: External ear normal. Tympanic membrane is not erythematous. No middle ear effusion. Left Ear: External ear normal. Tympanic membrane is not erythematous. No middle ear effusion. Nose: No mucosal edema. Mouth/Throat: Oropharynx is clear and moist. No posterior oropharyngeal erythema. Eyes: Conjunctivae and EOM are normal. Pupils are equal, round, and reactive to light. Neck: Normal range of motion. Neck supple. No thyromegaly present. Cardiovascular: Normal rate, regular rhythm and normal heart sounds. No murmur heard. Pulmonary/Chest: Effort normal and breath sounds normal. She has no wheezes. Shehas no rales. Abdominal: Soft. Bowel sounds are normal. She exhibits no distension and no mass. There is no tenderness. There is no rebound and no guarding. Genitourinary/Anorectal:deferred  Musculoskeletal: Normal range of motion. She exhibits no edema or tenderness. Lymphadenopathy: She has no cervical adenopathy. Neurological: She is alert and oriented to person, place, and time. She has normal reflexes. Skin: Skin is warm and dry. No rash noted. slight discolored area lat side nose not raised   Psychiatric: She has a normal mood and affect. Her   behavior is normal.       Assessment:      1. Type 2 diabetes mellitus with hyperglycemia, without long-term current use of insulin ([address]    2. Essential hypertension    3. Mild persistent asthma without complication    4. Hypertriglyceridemia    5. CRF (chronic renal failure), stage 1    6. Vitamin D deficiency    7. Well adult exam    8. Screening mammogram, encounter for          Plan:      Call or return to clinic prn if these symptoms worsen or fail to improve as anticipated. I have reviewed the instructions with the patient, answering all questions to her satisfaction. Return if symptoms worsen or fail to improve. Orders Placed This Encounter   Procedures    EUSEBIO DIGITAL SCREEN W OR WO CAD BILATERAL     Standing Status:   Future     Standing Expiration Date:   2022     Order Specific Question:   Reason for exam:     Answer:   screen    CBC Auto Differential     Standing Status:   Future     Standing Expiration Date:   2021    Comprehensive Metabolic Panel     Standing Status:   Future     Standing Expiration Date:   2021    Lipid Panel     Standing Status:   Future     Standing Expiration Date:   2021     Order Specific Question:   Is Patient Fasting?/# of Hours     Answer:   yes    T4, Free     Standing Status:   Future     Standing Expiration Date:   2021    TSH without Reflex     Standing Status:   Future     Standing Expiration Date:   2021    Vitamin D 25 Hydroxy     Standing Status:   Future     Standing Expiration Date:   2022    POCT glycosylated hemoglobin (Hb A1C)     Orders Placed This Encounter   Medications    Elastic Bandages & Supports (WRIST SPLINT/COCK-UP/LEFT L) MISC     Si Device by Does not apply route nightly Size to fit     Dispense:  1 each     Refill:  0    TRULICITY 4.5 IM/5.1CD SOPN     Sig: Inject 4.5 mg into the skin once a week     Dispense:  12 pen     Refill:  0    blood glucose monitor strips     Sig: Test 2 times a day & as needed for symptoms of irregular blood glucose. Dispense sufficient amount for indicated testing frequency plus additional to accommodate PRN testing needs. Dispense:  300 strip     Refill:  0     Brand per patient preference. May round up to next available package size.     Lancets MISC     Si each by Does not apply route 2 times daily     Dispense:  300 each     Refill:  5    glucose monitoring kit (FREESTYLE) monitoring kit     Si kit by Does not apply route daily     Dispense:  1 kit     Refill:  0     Increase Trulicity to 4.5   Check sugars Get weight back off and follow-up in the office in 3 months   electronically signed by Jenni Ganser, DO on 2/8/2021 at 10:56 AM

## 2021-02-15 DIAGNOSIS — E11.65 TYPE 2 DIABETES MELLITUS WITH HYPERGLYCEMIA, WITHOUT LONG-TERM CURRENT USE OF INSULIN (HCC): ICD-10-CM

## 2021-02-15 NOTE — TELEPHONE ENCOUNTER
Pt requests Blood glucose kit and supplies be sent to [facility] in [place]. Meds pended.

## 2021-02-16 RX ORDER — GLUCOSAMINE HCL/CHONDROITIN SU 500-400 MG
CAPSULE ORAL
Qty: 300 STRIP | Refills: 0 | Status: SHIPPED | OUTPATIENT
Start: 2021-02-16

## 2021-02-16 RX ORDER — BLOOD-GLUCOSE METER
1 KIT MISCELLANEOUS DAILY
Qty: 1 KIT | Refills: 0 | Status: SHIPPED | OUTPATIENT
Start: 2021-02-16

## 2021-02-16 RX ORDER — LANCETS 30 GAUGE
1 EACH MISCELLANEOUS 2 TIMES DAILY
Qty: 300 EACH | Refills: 5 | Status: SHIPPED | OUTPATIENT
Start: 2021-02-16

## 2021-03-01 ENCOUNTER — TELEPHONE (OUTPATIENT)
Dept: FAMILY MEDICINE CLINIC | Age: 56
End: 2021-03-01

## 2021-03-09 ENCOUNTER — TELEPHONE (OUTPATIENT)
Dept: FAMILY MEDICINE CLINIC | Age: 56
End: 2021-03-09

## 2021-04-26 ENCOUNTER — TELEPHONE (OUTPATIENT)
Dept: PRIMARY CARE CLINIC | Age: 56
End: 2021-04-26

## 2021-04-26 ENCOUNTER — HOSPITAL ENCOUNTER (OUTPATIENT)
Age: 56
Setting detail: SPECIMEN
Discharge: HOME | End: 2021-04-26
Payer: COMMERCIAL

## 2021-04-26 DIAGNOSIS — I10 ESSENTIAL HYPERTENSION: ICD-10-CM

## 2021-04-26 DIAGNOSIS — E55.9 VITAMIN D DEFICIENCY: ICD-10-CM

## 2021-04-26 DIAGNOSIS — Z00.00 WELL ADULT EXAM: ICD-10-CM

## 2021-04-26 DIAGNOSIS — E11.65 TYPE 2 DIABETES MELLITUS WITH HYPERGLYCEMIA, WITHOUT LONG-TERM CURRENT USE OF INSULIN (HCC): ICD-10-CM

## 2021-04-26 DIAGNOSIS — J45.30 MILD PERSISTENT ASTHMA WITHOUT COMPLICATION: ICD-10-CM

## 2021-04-26 DIAGNOSIS — E78.1 HYPERTRIGLYCERIDEMIA: ICD-10-CM

## 2021-04-26 DIAGNOSIS — N18.1 CRF (CHRONIC RENAL FAILURE), STAGE 1: ICD-10-CM

## 2021-04-26 LAB
ABSOLUTE EOS #: 0.25 K/UL (ref 0–0.44)
ABSOLUTE IMMATURE GRANULOCYTE: 0.08 K/UL (ref 0–0.3)
ABSOLUTE LYMPH #: 2.54 K/UL (ref 1.1–3.7)
ABSOLUTE MONO #: 0.7 K/UL (ref 0.1–1.2)
ALBUMIN SERPL-MCNC: 4.2 G/DL (ref 3.5–5.2)
ALBUMIN/GLOBULIN RATIO: 1.3 (ref 1–2.5)
ALP BLD-CCNC: 74 U/L (ref 35–104)
ALT SERPL-CCNC: 20 U/L (ref 5–33)
ANION GAP SERPL CALCULATED.3IONS-SCNC: 11 MMOL/L (ref 9–17)
AST SERPL-CCNC: 19 U/L
BASOPHILS # BLD: 1 % (ref 0–2)
BASOPHILS ABSOLUTE: 0.12 K/UL (ref 0–0.2)
BILIRUB SERPL-MCNC: 0.42 MG/DL (ref 0.3–1.2)
BUN BLDV-MCNC: 26 MG/DL (ref 6–20)
BUN/CREAT BLD: ABNORMAL (ref 9–20)
CALCIUM SERPL-MCNC: 9.7 MG/DL (ref 8.6–10.4)
CHLORIDE BLD-SCNC: 103 MMOL/L (ref 98–107)
CHOLESTEROL/HDL RATIO: 3.9
CHOLESTEROL: 129 MG/DL
CO2: 28 MMOL/L (ref 20–31)
CREAT SERPL-MCNC: 1.17 MG/DL (ref 0.5–0.9)
DIFFERENTIAL TYPE: ABNORMAL
EOSINOPHILS RELATIVE PERCENT: 2 % (ref 1–4)
GFR AFRICAN AMERICAN: 58 ML/MIN
GFR NON-AFRICAN AMERICAN: 48 ML/MIN
GFR SERPL CREATININE-BSD FRML MDRD: ABNORMAL ML/MIN/{1.73_M2}
GFR SERPL CREATININE-BSD FRML MDRD: ABNORMAL ML/MIN/{1.73_M2}
GLUCOSE BLD-MCNC: 109 MG/DL (ref 70–99)
HCT VFR BLD CALC: 46.5 % (ref 36.3–47.1)
HDLC SERPL-MCNC: 33 MG/DL
HEMOGLOBIN: 14.3 G/DL (ref 11.9–15.1)
IMMATURE GRANULOCYTES: 1 %
LDL CHOLESTEROL: 69 MG/DL (ref 0–130)
LYMPHOCYTES # BLD: 23 % (ref 24–43)
MCH RBC QN AUTO: 29.9 PG (ref 25.2–33.5)
MCHC RBC AUTO-ENTMCNC: 30.8 G/DL (ref 28.4–34.8)
MCV RBC AUTO: 97.3 FL (ref 82.6–102.9)
MONOCYTES # BLD: 6 % (ref 3–12)
NRBC AUTOMATED: 0 PER 100 WBC
PDW BLD-RTO: 13.6 % (ref 11.8–14.4)
PLATELET # BLD: 440 K/UL (ref 138–453)
PLATELET ESTIMATE: ABNORMAL
PMV BLD AUTO: 9.7 FL (ref 8.1–13.5)
POTASSIUM SERPL-SCNC: 4.8 MMOL/L (ref 3.7–5.3)
RBC # BLD: 4.78 M/UL (ref 3.95–5.11)
RBC # BLD: ABNORMAL 10*6/UL
SEG NEUTROPHILS: 67 % (ref 36–65)
SEGMENTED NEUTROPHILS ABSOLUTE COUNT: 7.2 K/UL (ref 1.5–8.1)
SODIUM BLD-SCNC: 142 MMOL/L (ref 135–144)
THYROXINE, FREE: 1.35 NG/DL (ref 0.93–1.7)
TOTAL PROTEIN: 7.4 G/DL (ref 6.4–8.3)
TRIGL SERPL-MCNC: 133 MG/DL
TSH SERPL DL<=0.05 MIU/L-ACNC: 1.46 MIU/L (ref 0.3–5)
VITAMIN D 25-HYDROXY: 59.4 NG/ML (ref 30–100)
VLDLC SERPL CALC-MCNC: ABNORMAL MG/DL (ref 1–30)
WBC # BLD: 10.9 K/UL (ref 3.5–11.3)
WBC # BLD: ABNORMAL 10*3/UL

## 2021-04-27 RX ORDER — DULAGLUTIDE 4.5 MG/.5ML
4.5 INJECTION, SOLUTION SUBCUTANEOUS WEEKLY
Qty: 6 ML | Refills: 3 | Status: SHIPPED | OUTPATIENT
Start: 2021-04-27 | End: 2022-07-26

## 2021-04-27 RX ORDER — SPIRONOLACTONE 25 MG/1
TABLET ORAL
Qty: 90 TABLET | Refills: 3 | Status: SHIPPED | OUTPATIENT
Start: 2021-04-27 | End: 2022-03-07

## 2021-04-27 NOTE — TELEPHONE ENCOUNTER
Benoit Gilliam is calling to request a refill on the following medication(s):    Last Visit Date (If Applicable):  7/3/4288    Next Visit Date:    Visit date not found    Medication Request:  Requested Prescriptions     Pending Prescriptions Disp Refills    TRULICITY 4.5 AL/1.7AI SOPN [Pharmacy Med Name: Job Hair SD PEN 9.4WM 4'S 4.5MG] 6 mL 3     Sig: INJECT 4.5 MG INTO THE SKIN ONCE A WEEK    spironolactone (ALDACTONE) 25 MG tablet [Pharmacy Med Name: SPIRONOLACTONE TABS 25MG] 90 tablet 3     Sig: TAKE 1 TABLET DAILY

## 2021-05-12 ENCOUNTER — HOSPITAL ENCOUNTER (EMERGENCY)
Age: 56
Discharge: HOME | End: 2021-05-12
Attending: EMERGENCY MEDICINE
Payer: COMMERCIAL

## 2021-05-12 ENCOUNTER — APPOINTMENT (OUTPATIENT)
Dept: CT IMAGING | Age: 56
End: 2021-05-12
Payer: COMMERCIAL

## 2021-05-12 VITALS
HEART RATE: 100 BPM | BODY MASS INDEX: 33.13 KG/M2 | TEMPERATURE: 98.8 F | RESPIRATION RATE: 20 BRPM | WEIGHT: 180 LBS | HEIGHT: 62 IN | SYSTOLIC BLOOD PRESSURE: 133 MMHG | DIASTOLIC BLOOD PRESSURE: 81 MMHG | OXYGEN SATURATION: 97 %

## 2021-05-12 DIAGNOSIS — N39.0 URINARY TRACT INFECTION WITHOUT HEMATURIA, SITE UNSPECIFIED: ICD-10-CM

## 2021-05-12 DIAGNOSIS — R10.9 FLANK PAIN: Primary | ICD-10-CM

## 2021-05-12 LAB
-: ABNORMAL
ABSOLUTE EOS #: 0.34 K/UL (ref 0–0.44)
ABSOLUTE IMMATURE GRANULOCYTE: 0.12 K/UL (ref 0–0.3)
ABSOLUTE LYMPH #: 3.23 K/UL (ref 1.1–3.7)
ABSOLUTE MONO #: 0.65 K/UL (ref 0.1–1.2)
ALBUMIN SERPL-MCNC: 3.7 G/DL (ref 3.5–5.2)
ALBUMIN/GLOBULIN RATIO: ABNORMAL (ref 1–2.5)
ALP BLD-CCNC: 72 U/L (ref 35–104)
ALT SERPL-CCNC: 21 U/L (ref 5–33)
AMORPHOUS: ABNORMAL
ANION GAP SERPL CALCULATED.3IONS-SCNC: 13 MMOL/L (ref 9–17)
AST SERPL-CCNC: 25 U/L
BACTERIA: ABNORMAL
BASOPHILS # BLD: 1 % (ref 0–2)
BASOPHILS ABSOLUTE: 0.14 K/UL (ref 0–0.2)
BILIRUB SERPL-MCNC: 0.26 MG/DL (ref 0.3–1.2)
BILIRUBIN DIRECT: 0.1 MG/DL
BILIRUBIN URINE: NEGATIVE
BILIRUBIN, INDIRECT: 0.16 MG/DL (ref 0–1)
BUN BLDV-MCNC: 23 MG/DL (ref 6–20)
BUN/CREAT BLD: 19 (ref 9–20)
CALCIUM SERPL-MCNC: 9.5 MG/DL (ref 8.6–10.4)
CASTS UA: ABNORMAL /LPF
CHLORIDE BLD-SCNC: 105 MMOL/L (ref 98–107)
CO2: 21 MMOL/L (ref 20–31)
COLOR: YELLOW
COMMENT UA: ABNORMAL
CREAT SERPL-MCNC: 1.23 MG/DL (ref 0.5–0.9)
CRYSTALS, UA: ABNORMAL /HPF
DIFFERENTIAL TYPE: ABNORMAL
EOSINOPHILS RELATIVE PERCENT: 3 % (ref 1–4)
EPITHELIAL CELLS UA: ABNORMAL /HPF (ref 0–5)
GFR AFRICAN AMERICAN: 55 ML/MIN
GFR NON-AFRICAN AMERICAN: 45 ML/MIN
GFR SERPL CREATININE-BSD FRML MDRD: ABNORMAL ML/MIN/{1.73_M2}
GFR SERPL CREATININE-BSD FRML MDRD: ABNORMAL ML/MIN/{1.73_M2}
GLOBULIN: ABNORMAL G/DL (ref 1.5–3.8)
GLUCOSE BLD-MCNC: 117 MG/DL (ref 70–99)
GLUCOSE URINE: NEGATIVE
HCT VFR BLD CALC: 42.2 % (ref 36.3–47.1)
HEMOGLOBIN: 13.1 G/DL (ref 11.9–15.1)
IMMATURE GRANULOCYTES: 1 %
KETONES, URINE: ABNORMAL
LACTIC ACID, SEPSIS WHOLE BLOOD: NORMAL MMOL/L (ref 0.5–1.9)
LACTIC ACID, SEPSIS: 0.8 MMOL/L (ref 0.5–1.9)
LEUKOCYTE ESTERASE, URINE: NEGATIVE
LIPASE: 47 U/L (ref 13–60)
LYMPHOCYTES # BLD: 25 % (ref 24–43)
MCH RBC QN AUTO: 29.9 PG (ref 25.2–33.5)
MCHC RBC AUTO-ENTMCNC: 31 G/DL (ref 28.4–34.8)
MCV RBC AUTO: 96.3 FL (ref 82.6–102.9)
MONOCYTES # BLD: 5 % (ref 3–12)
MUCUS: ABNORMAL
NITRITE, URINE: NEGATIVE
NRBC AUTOMATED: 0 PER 100 WBC
OTHER OBSERVATIONS UA: ABNORMAL
PDW BLD-RTO: 13.3 % (ref 11.8–14.4)
PH UA: 6.5 (ref 5–8)
PLATELET # BLD: 386 K/UL (ref 138–453)
PLATELET ESTIMATE: ABNORMAL
PMV BLD AUTO: 9 FL (ref 8.1–13.5)
POTASSIUM SERPL-SCNC: 4.3 MMOL/L (ref 3.7–5.3)
PROTEIN UA: NEGATIVE
RBC # BLD: 4.38 M/UL (ref 3.95–5.11)
RBC # BLD: ABNORMAL 10*6/UL
RBC UA: ABNORMAL /HPF (ref 0–2)
RENAL EPITHELIAL, UA: ABNORMAL /HPF
SEG NEUTROPHILS: 65 % (ref 36–65)
SEGMENTED NEUTROPHILS ABSOLUTE COUNT: 8.7 K/UL (ref 1.5–8.1)
SODIUM BLD-SCNC: 139 MMOL/L (ref 135–144)
SPECIFIC GRAVITY UA: 1.02 (ref 1–1.03)
TOTAL PROTEIN: 6.6 G/DL (ref 6.4–8.3)
TRICHOMONAS: ABNORMAL
TURBIDITY: ABNORMAL
URINE HGB: NEGATIVE
UROBILINOGEN, URINE: NORMAL
WBC # BLD: 13.2 K/UL (ref 3.5–11.3)
WBC # BLD: ABNORMAL 10*3/UL
WBC UA: ABNORMAL /HPF (ref 0–5)
YEAST: ABNORMAL

## 2021-05-12 PROCEDURE — 87088 URINE BACTERIA CULTURE: CPT

## 2021-05-12 PROCEDURE — 96376 TX/PRO/DX INJ SAME DRUG ADON: CPT

## 2021-05-12 PROCEDURE — 96375 TX/PRO/DX INJ NEW DRUG ADDON: CPT

## 2021-05-12 PROCEDURE — 81001 URINALYSIS AUTO W/SCOPE: CPT

## 2021-05-12 PROCEDURE — 74176 CT ABD & PELVIS W/O CONTRAST: CPT

## 2021-05-12 PROCEDURE — 85025 COMPLETE CBC W/AUTO DIFF WBC: CPT

## 2021-05-12 PROCEDURE — 96374 THER/PROPH/DIAG INJ IV PUSH: CPT

## 2021-05-12 PROCEDURE — 80048 BASIC METABOLIC PNL TOTAL CA: CPT

## 2021-05-12 PROCEDURE — 87086 URINE CULTURE/COLONY COUNT: CPT

## 2021-05-12 PROCEDURE — 87186 SC STD MICRODIL/AGAR DIL: CPT

## 2021-05-12 PROCEDURE — 6360000002 HC RX W HCPCS: Performed by: NURSE PRACTITIONER

## 2021-05-12 PROCEDURE — 2580000003 HC RX 258: Performed by: NURSE PRACTITIONER

## 2021-05-12 PROCEDURE — 83605 ASSAY OF LACTIC ACID: CPT

## 2021-05-12 PROCEDURE — 83690 ASSAY OF LIPASE: CPT

## 2021-05-12 PROCEDURE — 80076 HEPATIC FUNCTION PANEL: CPT

## 2021-05-12 PROCEDURE — 99283 EMERGENCY DEPT VISIT LOW MDM: CPT

## 2021-05-12 PROCEDURE — 6370000000 HC RX 637 (ALT 250 FOR IP): Performed by: NURSE PRACTITIONER

## 2021-05-12 RX ORDER — SODIUM CHLORIDE 9 MG/ML
INJECTION, SOLUTION INTRAVENOUS CONTINUOUS
Status: DISCONTINUED | OUTPATIENT
Start: 2021-05-12 | End: 2021-05-13 | Stop reason: HOSPADM

## 2021-05-12 RX ORDER — HYDROCODONE BITARTRATE AND ACETAMINOPHEN 5; 325 MG/1; MG/1
1 TABLET ORAL EVERY 8 HOURS PRN
Qty: 20 TABLET | Refills: 0 | Status: SHIPPED | OUTPATIENT
Start: 2021-05-12 | End: 2021-05-19

## 2021-05-12 RX ORDER — CEPHALEXIN 500 MG/1
500 CAPSULE ORAL ONCE
Status: COMPLETED | OUTPATIENT
Start: 2021-05-12 | End: 2021-05-12

## 2021-05-12 RX ORDER — ONDANSETRON 2 MG/ML
4 INJECTION INTRAMUSCULAR; INTRAVENOUS ONCE
Status: COMPLETED | OUTPATIENT
Start: 2021-05-12 | End: 2021-05-12

## 2021-05-12 RX ORDER — CEPHALEXIN 500 MG/1
500 CAPSULE ORAL 2 TIMES DAILY
Qty: 10 CAPSULE | Refills: 0 | Status: SHIPPED | OUTPATIENT
Start: 2021-05-12 | End: 2021-05-17

## 2021-05-12 RX ORDER — MORPHINE SULFATE 4 MG/ML
4 INJECTION, SOLUTION INTRAMUSCULAR; INTRAVENOUS ONCE
Status: COMPLETED | OUTPATIENT
Start: 2021-05-12 | End: 2021-05-12

## 2021-05-12 RX ORDER — ORPHENADRINE CITRATE 30 MG/ML
60 INJECTION INTRAMUSCULAR; INTRAVENOUS ONCE
Status: COMPLETED | OUTPATIENT
Start: 2021-05-12 | End: 2021-05-12

## 2021-05-12 RX ORDER — TIZANIDINE 4 MG/1
4 TABLET ORAL EVERY 8 HOURS PRN
Qty: 15 TABLET | Refills: 0 | Status: SHIPPED | OUTPATIENT
Start: 2021-05-12

## 2021-05-12 RX ADMIN — SODIUM CHLORIDE 100 ML/HR: 9 INJECTION, SOLUTION INTRAVENOUS at 22:01

## 2021-05-12 RX ADMIN — ORPHENADRINE CITRATE 60 MG: 30 INJECTION INTRAMUSCULAR; INTRAVENOUS at 23:06

## 2021-05-12 RX ADMIN — ONDANSETRON 4 MG: 2 INJECTION INTRAMUSCULAR; INTRAVENOUS at 22:01

## 2021-05-12 RX ADMIN — CEPHALEXIN 500 MG: 500 CAPSULE ORAL at 23:06

## 2021-05-12 RX ADMIN — MORPHINE SULFATE 4 MG: 4 INJECTION, SOLUTION INTRAMUSCULAR; INTRAVENOUS at 22:02

## 2021-05-12 RX ADMIN — MORPHINE SULFATE 4 MG: 4 INJECTION, SOLUTION INTRAMUSCULAR; INTRAVENOUS at 23:07

## 2021-05-12 ASSESSMENT — ENCOUNTER SYMPTOMS
SHORTNESS OF BREATH: 0
DIARRHEA: 0
BACK PAIN: 1
VOMITING: 0
COUGH: 0
NAUSEA: 0
ABDOMINAL PAIN: 0
COLOR CHANGE: 0

## 2021-05-12 ASSESSMENT — PAIN DESCRIPTION - PAIN TYPE: TYPE: ACUTE PAIN

## 2021-05-12 ASSESSMENT — PAIN SCALES - GENERAL
PAINLEVEL_OUTOF10: 7
PAINLEVEL_OUTOF10: 4

## 2021-05-13 NOTE — ED PROVIDER NOTES
eMERGENCY dEPARTMENT eNCOUnter   Independent Attestation     Pt Name: Mirna Wills  MRN: [id number]  [place] 1965  Date of evaluation: 5/12/21     Mirna Wills is a 61 y.o. female with CC: Back Pain (Pt c/o back pain yesterday. Pt reports that pain today when she woke around 16:00, pain was significantly worse to the right flank. Pt reports that she is urinating without difficulty. Pt denies hx of kidney stones )      Based on the medical record the care appears appropriate. I was personally available for consultation in the Emergency Department.     Phi Quintana MD  Attending Emergency Physician                    Jose Angel Unger MD  05/12/21 0205

## 2021-05-13 NOTE — ED PROVIDER NOTES
[facility] [address] ED  eMERGENCY dEPARTMENT eNCOUnter      Pt Name: Carlos Bah  MRN: [id number]  Maryannegfshirley 1965  Date of evaluation: 5/12/2021  Provider: TAMIKA Navarro [id number]       Chief Complaint   Patient presents with    Back Pain     Pt c/o back pain yesterday. Pt reports that pain today when she woke around 16:00, pain was significantly worse to the right flank. Pt reports that she is urinating without difficulty. Pt denies hx of kidney stones          HISTORY OF PRESENT ILLNESS  (Location/Symptom, Timing/Onset, Context/Setting, Quality, Duration, Modifying Factors, Severity.)   Carlos Bah is a 61 y.o. female who presents to the emergency department via private auto for right flank pain. Onset was yesterday. It became worse today. Denies fever, chills, injury, N/V/D, urinary symptoms. Rates her pain 7/10 at this time. She is accompanied by family. Reports having surgery to repair her ureters when she was 14. Nursing Notes were reviewed. ALLERGIES     Patient has no known allergies. CURRENT MEDICATIONS       Discharge Medication List as of 5/12/2021 10:53 PM      CONTINUE these medications which have NOT CHANGED    Details   TRULICITY 4.5 AG/3.9YE SOPN INJECT 4.5 MG INTO THE SKIN ONCE A WEEK, Disp-6 mL, R-3, DAWNormal      spironolactone (ALDACTONE) 25 MG tablet TAKE 1 TABLET DAILY, Disp-90 tablet, R-3Normal      glucose monitoring kit (FREESTYLE) monitoring kit DAILY Starting Tue 2/16/2021, Disp-1 kit, R-0, Normal      !! Lancets MISC 2 TIMES DAILY Starting Tue 2/16/2021, Disp-300 each, R-5, Normal      !! blood glucose monitor strips Test 2 times a day & as needed for symptoms of irregular blood glucose. Dispense sufficient amount for indicated testing frequency plus additional to accommodate PRN testing needs. , Disp-300 strip, R-0, Normal      !!  Elastic Bandages & Supports (WRIST SPLINT/COCK-UP/LEFT L) MISC NIGHTLY Starting Mon 2/8/2021, Disp-1 each, R-0, PrintSize to fit      lisinopril (PRINIVIL;ZESTRIL) 10 MG tablet TAKE 1 TABLET DAILY, Disp-90 tablet, R-3Normal      fenofibrate micronized (LOFIBRA) 134 MG capsule TAKE 1 CAPSULE EVERY MORNING BEFORE BREAKFAST, Disp-90 capsule, R-3Normal      diclofenac sodium (VOLTAREN) 1 % GEL APPLY 4 GRAMS TOPICALLY FOUR TIMES A DAY, Disp-500 g, R-10, Normal      bumetanide (BUMEX) 1 MG tablet TAKE oneTABLET DAILY, Disp-90 tablet, R-3Normal      fluconazole (DIFLUCAN) 150 MG tablet May repeat in 3-5 days, if symptoms persist or recur., Disp-30 tablet, R-1Normal      !! Elastic Bandages & Supports (WRIST SPLINT/COCK-UP/RIGHT L) MISC NIGHTLY Starting Thu 2020, Disp-1 each, R-0, Print      TRULICITY 1.5 CP/0.2BK SOPN INJECT 1.5   MG UNDER THE SKIN ONCE A WEEK, Disp-6 mL, R-4, DAWNormal      albuterol sulfate (PROAIR RESPICLICK) 633 (90 Base) MCG/ACT aerosol powder inhalation Inhale 2 puffs into the lungs every 6 hours as needed for Wheezing or Shortness of Breath, Disp-3 Inhaler, R-1Normal      !! LifeScan Unistik II Lancets MISC Disp-200 each, R-3, NormalTest 2-3 times daily. Please dispense Complexacan test strips #200 3 refills        !! ONETOUCH VERIO strip 1 each by In Vitro route daily As needed. , Disp-100 each, R-3, QUIN      albuterol sulfate HFA (PROVENTIL HFA) 108 (90 BASE) MCG/ACT inhaler Inhale 2 puffs into the lungs every 6 hours as needed for Wheezing, Disp-3 Inhaler, R-3       !! - Potential duplicate medications found. Please discuss with provider.           PAST MEDICAL HISTORY         Diagnosis Date    [facility] [address]     Hyperlipidemia     Hypertension        SURGICAL HISTORY           Procedure Laterality Date    BLADDER SURGERY       SECTION      x2    TONSILLECTOMY           FAMILY HISTORY           Problem Relation Age of Onset    Other Mother         sleep apnea    Cancer Father     Kidney Disease Sister     Other Sister         obesity     Family Status Relation Name Status    Mother  Alive    Father  Alive    Sister ##Sister1 Alive        SOCIAL HISTORY      reports that she has been smoking cigarettes. She has a 24.00 pack-year smoking history. She has never used smokeless tobacco. She reports that she does not drink alcohol or use drugs. REVIEW OF SYSTEMS    (2-9 systems for level 4, 10 or more for level 5)     Review of Systems   Constitutional: Negative for chills, diaphoresis, fatigue and fever. Respiratory: Negative for cough and shortness of breath. Cardiovascular: Negative for chest pain. Gastrointestinal: Negative for abdominal pain, diarrhea, nausea and vomiting. Genitourinary: Positive for flank pain. Negative for dysuria, frequency, hematuria and urgency. Musculoskeletal: Positive for back pain. Skin: Negative for color change, rash and wound. Neurological: Negative for weakness and numbness. Except as noted above the remainder of the review of systems was reviewed and negative. PHYSICAL EXAM    (up to 7 for level 4, 8 or more for level 5)     ED Triage Vitals   BP Temp Temp src Pulse Resp SpO2 Height Weight   05/12/21 2044 05/12/21 2044 -- 05/12/21 2044 05/12/21 2044 05/12/21 2044 05/12/21 2042 05/12/21 2042   133/81 98.8 °F (37.1 °C)  100 20 97 % 5' 2\" (1.575 m) 180 lb (81.6 kg)     Physical Exam  Vitals signs reviewed. Constitutional:       General: She is not in acute distress. Appearance: She is well-developed. She is not diaphoretic. Eyes:      General: No scleral icterus. Conjunctiva/sclera: Conjunctivae normal.   Cardiovascular:      Rate and Rhythm: Normal rate. Pulmonary:      Effort: Pulmonary effort is normal. No respiratory distress. Breath sounds: No stridor. Abdominal:      General: There is no distension. Palpations: Abdomen is soft. Tenderness: There is no abdominal tenderness. There is right CVA tenderness. There is no guarding.    Musculoskeletal:      Thoracic back: She exhibits tenderness and pain. She exhibits no bony tenderness and no swelling. Lumbar back: She exhibits tenderness and pain. She exhibits no bony tenderness, no swelling and no deformity. Comments: Moves extremities   Skin:     General: Skin is warm and dry. Findings: No rash. Neurological:      Mental Status: She is alert and oriented to person, place, and time. Psychiatric:         Behavior: Behavior normal.         DIAGNOSTIC RESULTS     RADIOLOGY:   Non-plain film images such as CT, Ultrasound and MRI are read by the radiologist. [facility] radiographic images are visualized and preliminarily interpreted by the emergency physician with the below findings:    Interpretation per the Radiologist below, if available at the time of this note:    Ct Abdomen Pelvis Wo Contrast Additional Contrast? None    Result Date: 5/12/2021  EXAMINATION: CT OF THE ABDOMEN AND PELVIS WITHOUT CONTRAST 5/12/2021 8:26 pm TECHNIQUE: CT of the abdomen and pelvis was performed without the administration of intravenous contrast. Multiplanar reformatted images are provided for review. Dose modulation, iterative reconstruction, and/or weight based adjustment of the mA/kV was utilized to reduce the radiation dose to as low as reasonably achievable. COMPARISON: None. HISTORY: ORDERING SYSTEM PROVIDED HISTORY: right flank pain TECHNOLOGIST PROVIDED HISTORY: right flank pain Decision Support Exception - unselect if not a suspected or confirmed emergency medical condition->Emergency Medical Condition (MA) Reason for Exam: Pt c/o right side flank pain since last night. Pt states she started getting nauseous while here in the ED. Hx of urethra tube replaced with plastic valves as a child. Acuity: Acute Type of Exam: Initial Relevant Medical/Surgical History: Hx of urethra tube replaced with plastic valves as a child. FINDINGS: Lower Chest: The heart is enlarged. The visualized lung bases are grossly clear. Organs:   The liver, spleen, pancreas, kidneys, gallbladder, and adrenal glands have an unremarkable unenhanced CT appearance. There are no renal, ureteral, or bladder calculi. No hydronephrosis or hydroureter. GI/Bowel: Scattered diverticula are present throughout the sigmoid colon. The stomach and the small and large bowel loops are otherwise normal in caliber, contour, and morphology, without acute or significant abnormality. No dilated loops or areas of bowel wall thickening. The appendix is not visualized, although there are no findings of acute appendicitis. Peritoneum/Retroperitoneum: There is no free fluid or extraluminal gas. No enlarged or suspicious mesenteric or retroperitoneal lymphadenopathy. The abdominal aorta and iliac arteries are normal in caliber. Pelvis: No pelvic free fluid or enlarged or suspicious pelvic or inguinal lymphadenopathy. No appreciable uterine or adnexal abnormality. The urinary bladder and the pelvic bowel loops are unremarkable. Bones/Soft Tissues: Degenerative changes are present within the lower lumbar spine. No acute fracture. Sigmoid diverticulosis, without evidence of acute diverticulitis. Otherwise, unremarkable unenhanced CT examination of the abdomen and pelvis, without finding to account for the patient's symptoms.        LABS:  Labs Reviewed   CULTURE, URINE - Abnormal; Notable for the following components:       Result Value    Culture ESCHERICHIA COLI >[id number] CFU/ML (*)     All other components within normal limits   URINALYSIS WITH MICROSCOPIC - Abnormal; Notable for the following components:    Turbidity UA SLIGHTLY CLOUDY (*)     Ketones, Urine TRACE (*)     Bacteria, UA MODERATE (*)     Amorphous, UA 1+ (*)     All other components within normal limits   BASIC METABOLIC PANEL - Abnormal; Notable for the following components:    Glucose 117 (*)     BUN 23 (*)     CREATININE 1.23 (*)     GFR Non- 45 (*)     GFR  55 (*)     All other components within normal limits   CBC WITH AUTO DIFFERENTIAL - Abnormal; Notable for the following components:    WBC 13.2 (*)     Immature Granulocytes 1 (*)     Segs Absolute 8.70 (*)     All other components within normal limits   HEPATIC FUNCTION PANEL - Abnormal; Notable for the following components: Total Bilirubin 0.26 (*)     All other components within normal limits   LIPASE   LACTATE, SEPSIS       All other labs were within normal range or not returned as of this dictation. EMERGENCY DEPARTMENT COURSE and DIFFERENTIAL DIAGNOSIS/MDM:   Vitals:    Vitals:    05/12/21 2042 05/12/21 2044   BP:  133/81   Pulse:  100   Resp:  20   Temp:  98.8 °F (37.1 °C)   SpO2:  97%   Weight: 180 lb (81.6 kg)    Height: 5' 2\" (1.575 m)        MEDICATIONS GIVEN IN THE ED:  Medications   ondansetron (ZOFRAN) injection 4 mg (4 mg Intravenous Given 5/12/21 2201)   morphine sulfate (PF) injection 4 mg (4 mg Intravenous Given 5/12/21 2202)   morphine sulfate (PF) injection 4 mg (4 mg Intravenous Given 5/12/21 2307)   orphenadrine (NORFLEX) injection 60 mg (60 mg Intravenous Given 5/12/21 2306)   cephALEXin (KEFLEX) capsule 500 mg (500 mg Oral Given 5/12/21 2306)       CLINICAL DECISION MAKING:  The patient presented alert with a nontoxic appearance and was seen in conjunction with Dr. Sarahy Luis. Urinalysis showed infection; culture is pending. Imaging was negative for acute findings. Prescriptions were written for keflex, Zanaflex, and norco. The patient was advised to not drink alcohol, drive, or operate heavy machinery while taking the Zanaflex and/or norco. Follow up with pcp, return to ED if condition worsens. OARRS was reviewed. A ride was present. FINAL IMPRESSION      1. Flank pain    2.  Urinary tract infection without hematuria, site unspecified            Problem List  Patient Active Problem List   Diagnosis Code    Hyperlipidemia E78.5    DM (diabetes mellitus) ([facility] [address] E11.9    Hypertriglyceridemia E78.1    CRF (chronic

## 2021-05-14 LAB
CULTURE: ABNORMAL
Lab: ABNORMAL
SPECIMEN DESCRIPTION: ABNORMAL

## 2021-05-14 NOTE — ED PROVIDER NOTES
The patient was unable to fill her prescription at her pharmacy in [place] because it had been written by a nurse practitioner here in [address] [place] [address] Dr. MARES have handwritten a prescription for 20 Norco tablets.      Nancy Deal MD  05/14/21 1931

## 2021-05-18 ENCOUNTER — TELEPHONE (OUTPATIENT)
Dept: FAMILY MEDICINE CLINIC | Age: 56
End: 2021-05-18

## 2021-05-26 ENCOUNTER — OFFICE VISIT (OUTPATIENT)
Dept: FAMILY MEDICINE CLINIC | Age: 56
End: 2021-05-26
Payer: COMMERCIAL

## 2021-05-26 VITALS
WEIGHT: 179 LBS | OXYGEN SATURATION: 95 % | HEART RATE: 107 BPM | BODY MASS INDEX: 32.94 KG/M2 | DIASTOLIC BLOOD PRESSURE: 77 MMHG | SYSTOLIC BLOOD PRESSURE: 110 MMHG | HEIGHT: 62 IN

## 2021-05-26 DIAGNOSIS — J45.30 MILD PERSISTENT ASTHMA WITHOUT COMPLICATION: ICD-10-CM

## 2021-05-26 DIAGNOSIS — F17.210 CIGARETTE NICOTINE DEPENDENCE WITHOUT COMPLICATION: ICD-10-CM

## 2021-05-26 DIAGNOSIS — E11.65 TYPE 2 DIABETES MELLITUS WITH HYPERGLYCEMIA, WITHOUT LONG-TERM CURRENT USE OF INSULIN (HCC): Primary | ICD-10-CM

## 2021-05-26 LAB — HBA1C MFR BLD: 6.2 %

## 2021-05-26 PROCEDURE — 83036 HEMOGLOBIN GLYCOSYLATED A1C: CPT | Performed by: FAMILY MEDICINE

## 2021-05-26 PROCEDURE — 99213 OFFICE O/P EST LOW 20 MIN: CPT | Performed by: FAMILY MEDICINE

## 2021-05-26 RX ORDER — ALBUTEROL SULFATE 90 UG/1
2 AEROSOL, METERED RESPIRATORY (INHALATION) EVERY 4 HOURS PRN
Qty: 3 INHALER | Refills: 3 | Status: SHIPPED | OUTPATIENT
Start: 2021-05-26

## 2021-05-26 ASSESSMENT — PATIENT HEALTH QUESTIONNAIRE - PHQ9
SUM OF ALL RESPONSES TO PHQ9 QUESTIONS 1 & 2: 0
SUM OF ALL RESPONSES TO PHQ QUESTIONS 1-9: 0
1. LITTLE INTEREST OR PLEASURE IN DOING THINGS: 0

## 2021-05-26 NOTE — PROGRESS NOTES
[facility]  [address] [place] [address] Dr MARTIN [address] [facility] [address]  Dept: [phone]      Sujit Ibrahim is a 61 y.o. female who presents today for follow up on her  medical conditions as noted below. Chief Complaint   Patient presents with    Other     ER follow up        Patient Active Problem List:     Hyperlipidemia     DM (diabetes mellitus) ([facility] [address]     Hypertriglyceridemia     CRF (chronic renal failure)     Asthma     HTN (hypertension)     Past Medical History:   Diagnosis Date    [facility] ([facility]     Hyperlipidemia     Hypertension       Past Surgical History:   Procedure Laterality Date    BLADDER SURGERY       SECTION      x2    TONSILLECTOMY       Family History   Problem Relation Age of Onset    Other Mother         sleep apnea    Cancer Father     Kidney Disease Sister     Other Sister         obesity       Current Outpatient Medications   Medication Sig Dispense Refill    albuterol sulfate  (90 Base) MCG/ACT inhaler Inhale 2 puffs into the lungs every 4 hours as needed for Wheezing or Shortness of Breath 3 Inhaler 3    nicotine polacrilex (NICORETTE) 2 MG gum Take 1 each by mouth as needed for Smoking cessation Maximum dose: 24 pieces/24 hours. 014 each 3    TRULICITY 4.5 YG/5.3XF SOPN INJECT 4.5 MG INTO THE SKIN ONCE A WEEK 6 mL 3    spironolactone (ALDACTONE) 25 MG tablet TAKE 1 TABLET DAILY 90 tablet 3    glucose monitoring kit (FREESTYLE) monitoring kit 1 kit by Does not apply route daily 1 kit 0    Lancets MISC 1 each by Does not apply route 2 times daily 300 each 5    blood glucose monitor strips Test 2 times a day & as needed for symptoms of irregular blood glucose. Dispense sufficient amount for indicated testing frequency plus additional to accommodate PRN testing needs.  300 strip 0    Elastic Bandages & Supports (WRIST SPLINT/COCK-UP/LEFT L) MISC 1 Device by Does not apply route nightly Size to fit 1 each 0    lisinopril (PRINIVIL;ZESTRIL) 10 MG tablet TAKE 1 TABLET DAILY 90 tablet 3    fenofibrate micronized (LOFIBRA) 134 MG capsule TAKE 1 CAPSULE EVERY MORNING BEFORE BREAKFAST 90 capsule 3    diclofenac sodium (VOLTAREN) 1 % GEL APPLY 4 GRAMS TOPICALLY FOUR TIMES A  g 10    bumetanide (BUMEX) 1 MG tablet TAKE oneTABLET DAILY 90 tablet 3    fluconazole (DIFLUCAN) 150 MG tablet May repeat in 3-5 days, if symptoms persist or recur. 30 tablet 1    tiZANidine (ZANAFLEX) 4 MG tablet Take 1 tablet by mouth every 8 hours as needed (back pain) (Patient not taking: Reported on 5/26/2021) 15 tablet 0    Elastic Bandages & Supports (WRIST SPLINT/COCK-UP/RIGHT L) MISC 1 Units by Does not apply route nightly (Patient not taking: Reported on 2/8/2021) 1 each 0    TRULICITY 1.5 OS/3.7II SOPN INJECT 1.5   MG UNDER THE SKIN ONCE A WEEK (Patient not taking: Reported on 5/26/2021) 6 mL 4    LifeScan Unistik II Lancets MISC Test 2-3 times daily. Please dispense lifescan test strips #200 3 refills (Patient not taking: Reported on 2/8/2021) 200 each 3    ONETOUCH VERIO strip 1 each by In Vitro route daily As needed. (Patient not taking: Reported on 2/8/2021) 100 each 3    albuterol sulfate HFA (PROVENTIL HFA) 108 (90 BASE) MCG/ACT inhaler Inhale 2 puffs into the lungs every 6 hours as needed for Wheezing (Patient not taking: Reported on 5/26/2021) 3 Inhaler 3     No current facility-administered medications for this visit.      ALLERGIES:  No Known Allergies    Social History     Tobacco Use    Smoking status: Current Every Day Smoker     Packs/day: 0.80     Years: 30.00     Pack years: 24.00     Types: Cigarettes    Smokeless tobacco: Never Used   Substance Use Topics    Alcohol use: No        LDL Calculated (mg/dL)   Date Value   02/25/2020 97     LDL Cholesterol (mg/dL)   Date Value   04/26/2021 69     HDL (mg/dL)   Date Value   04/26/2021 33 (L)     BUN (mg/dL)   Date Value   05/12/2021 23 (H) Placed This Encounter   Procedures    POCT glycosylated hemoglobin (Hb A1C)     Orders Placed This Encounter   Medications    albuterol sulfate  (90 Base) MCG/ACT inhaler     Sig: Inhale 2 puffs into the lungs every 4 hours as needed for Wheezing or Shortness of Breath     Dispense:  3 Inhaler     Refill:  3    nicotine polacrilex (NICORETTE) 2 MG gum     Sig: Take 1 each by mouth as needed for Smoking cessation Maximum dose: 24 pieces/24 hours.      Dispense:  330 each     Refill:  3   Continue on all meds continue to try and lose weight try and quit smoking and follow-up in the office in 6 months or as needed    Electronically signed by Christi Martinez DO on 5/26/2021 at 9:17 AM

## 2021-08-04 DIAGNOSIS — J45.30 MILD PERSISTENT ASTHMA WITHOUT COMPLICATION: ICD-10-CM

## 2021-08-04 RX ORDER — BUMETANIDE 1 MG/1
TABLET ORAL
Qty: 90 TABLET | Refills: 3 | Status: SHIPPED | OUTPATIENT
Start: 2021-08-04 | End: 2021-08-06 | Stop reason: SDUPTHER

## 2021-08-04 NOTE — TELEPHONE ENCOUNTER
----- Message from Pamela Chamberlain sent at 8/4/2021 12:20 PM EDT -----  Subject: Refill Request    QUESTIONS  Name of Medication? bumetanide (BUMEX) 1 MG tablet  Patient-reported dosage and instructions? 1mg tablet daily  How many days do you have left? 0  Preferred Pharmacy? [id number]  Pharmacy phone number (if available)? [phone]  Additional Information for Provider? ######PLEASE SEND TO ? [facility]   mail order#########, (not available for selection)  ---------------------------------------------------------------------------  --------------  CALL BACK INFO  What is the best way for the office to contact you? OK to leave message on   voicemail  Preferred Call Back Phone Number?  [phone]

## 2021-08-06 ENCOUNTER — TELEPHONE (OUTPATIENT)
Dept: FAMILY MEDICINE CLINIC | Age: 56
End: 2021-08-06

## 2021-08-06 DIAGNOSIS — J45.30 MILD PERSISTENT ASTHMA WITHOUT COMPLICATION: ICD-10-CM

## 2021-08-06 RX ORDER — BUMETANIDE 1 MG/1
TABLET ORAL
Qty: 90 TABLET | Refills: 3 | Status: SHIPPED | OUTPATIENT
Start: 2021-08-06 | End: 2021-08-09 | Stop reason: SDUPTHER

## 2021-08-08 DIAGNOSIS — J45.30 MILD PERSISTENT ASTHMA WITHOUT COMPLICATION: ICD-10-CM

## 2021-08-08 NOTE — TELEPHONE ENCOUNTER
There are bunch of [employer] but nothing [facility] will need to get further information and enter the pharmacy where it needs to be sent

## 2021-08-09 DIAGNOSIS — J45.30 MILD PERSISTENT ASTHMA WITHOUT COMPLICATION: ICD-10-CM

## 2021-08-09 RX ORDER — BUMETANIDE 1 MG/1
TABLET ORAL
Qty: 90 TABLET | Refills: 3 | Status: SHIPPED | OUTPATIENT
Start: 2021-08-09 | End: 2022-07-26

## 2021-08-17 ENCOUNTER — NURSE ONLY (OUTPATIENT)
Dept: FAMILY MEDICINE CLINIC | Age: 56
End: 2021-08-17

## 2021-08-17 ENCOUNTER — HOSPITAL ENCOUNTER (OUTPATIENT)
Age: 56
Setting detail: SPECIMEN
Discharge: HOME | End: 2021-08-17
Payer: COMMERCIAL

## 2021-08-17 DIAGNOSIS — R30.0 DYSURIA: ICD-10-CM

## 2021-08-17 DIAGNOSIS — R30.0 DYSURIA: Primary | ICD-10-CM

## 2021-08-18 LAB
CULTURE: NO GROWTH
Lab: NORMAL
SPECIMEN DESCRIPTION: NORMAL

## 2021-11-15 DIAGNOSIS — I10 ESSENTIAL HYPERTENSION: ICD-10-CM

## 2021-11-15 DIAGNOSIS — E78.1 HYPERTRIGLYCERIDEMIA: ICD-10-CM

## 2021-11-15 DIAGNOSIS — J45.30 MILD PERSISTENT ASTHMA WITHOUT COMPLICATION: ICD-10-CM

## 2021-11-15 RX ORDER — FENOFIBRATE 134 MG/1
CAPSULE ORAL
Qty: 90 CAPSULE | Refills: 3 | Status: CANCELLED | OUTPATIENT
Start: 2021-11-15

## 2021-11-15 RX ORDER — LISINOPRIL 10 MG/1
TABLET ORAL
Qty: 90 TABLET | Refills: 3 | Status: CANCELLED | OUTPATIENT
Start: 2021-11-15

## 2021-11-15 NOTE — TELEPHONE ENCOUNTER
Rolando Hallman is calling to request a refill on the following medication(s):    Last Visit Date (If Applicable):  2/66/6148    Next Visit Date:    Visit date not found    Medication Request:  Requested Prescriptions     Pending Prescriptions Disp Refills    lisinopril (PRINIVIL;ZESTRIL) 10 MG tablet 90 tablet 3    fenofibrate micronized (LOFIBRA) 134 MG capsule 90 capsule 3

## 2021-11-16 RX ORDER — LISINOPRIL 10 MG/1
TABLET ORAL
Qty: 90 TABLET | Refills: 3 | Status: SHIPPED | OUTPATIENT
Start: 2021-11-16 | End: 2021-11-17 | Stop reason: SDUPTHER

## 2021-11-16 RX ORDER — FENOFIBRATE 134 MG/1
CAPSULE ORAL
Qty: 90 CAPSULE | Refills: 3 | Status: SHIPPED | OUTPATIENT
Start: 2021-11-16 | End: 2021-11-17 | Stop reason: SDUPTHER

## 2021-11-17 DIAGNOSIS — J45.30 MILD PERSISTENT ASTHMA WITHOUT COMPLICATION: ICD-10-CM

## 2021-11-17 DIAGNOSIS — E78.1 HYPERTRIGLYCERIDEMIA: ICD-10-CM

## 2021-11-17 DIAGNOSIS — I10 ESSENTIAL HYPERTENSION: ICD-10-CM

## 2021-11-17 RX ORDER — LISINOPRIL 10 MG/1
TABLET ORAL
Qty: 90 TABLET | Refills: 3 | Status: SHIPPED | OUTPATIENT
Start: 2021-11-17 | End: 2022-09-08

## 2021-11-17 RX ORDER — FENOFIBRATE 134 MG/1
CAPSULE ORAL
Qty: 90 CAPSULE | Refills: 3 | Status: SHIPPED | OUTPATIENT
Start: 2021-11-17 | End: 2022-10-24

## 2021-11-17 NOTE — TELEPHONE ENCOUNTER
----- Message from Natalie Néstorme sent at 11/17/2021 12:50 PM EST -----  Subject: Refill Request    QUESTIONS  Name of Medication? lisinopril (PRINIVIL;ZESTRIL) 10 MG tablet  Patient-reported dosage and instructions? 1 daily  How many days do you have left? 0  Preferred Pharmacy? [facility] phone number (if available)? [phone]  Additional Information for Provider? Her scripts were sent to [facility] and need to be sent to [facility] from now on. She has been out of   these medications over a week and would like to  a 10 day supply   [facility] [place] [facility] and then have the rest sent through   [facility].  ---------------------------------------------------------------------------  --------------,  Name of Medication? fenofibrate micronized (LOFIBRA) 134 MG capsule  Patient-reported dosage and instructions? 134 Mg 1 daily  How many days do you have left? 14  Preferred Pharmacy? [facility] phone number (if available)? [phone]  Additional Information for Provider? Her scripts were sent to [facility] and need to be sent to [facility] from now on.  ---------------------------------------------------------------------------  --------------  [address]  What is the best way for the office to contact you? OK to leave message on   voicemail  Preferred Call Back Phone Number?  [phone]

## 2022-03-07 DIAGNOSIS — J45.30 MILD PERSISTENT ASTHMA WITHOUT COMPLICATION: ICD-10-CM

## 2022-03-07 DIAGNOSIS — I10 ESSENTIAL HYPERTENSION: ICD-10-CM

## 2022-03-07 DIAGNOSIS — N18.1 CRF (CHRONIC RENAL FAILURE), STAGE 1: ICD-10-CM

## 2022-03-07 RX ORDER — SPIRONOLACTONE 25 MG/1
TABLET ORAL
Qty: 90 TABLET | Refills: 3 | Status: SHIPPED | OUTPATIENT
Start: 2022-03-07

## 2022-03-07 NOTE — TELEPHONE ENCOUNTER
Pharm requested    Health Maintenance   Topic Date Due    Hepatitis B vaccine (1 of 3 - Risk 3-dose series) Never done    Colorectal Cancer Screen  Never done    Shingles Vaccine (1 of 2) Never done    Low dose CT lung screening  Never done    Breast cancer screen  01/02/2016    Cervical cancer screen  03/12/2016    Diabetic foot exam  10/09/2016    Diabetic retinal exam  10/09/2016    Pneumococcal 0-64 years Vaccine (1 of 2 - PPSV23) 08/01/2019    Flu vaccine (1) 09/01/2021    COVID-19 Vaccine (3 - Booster for Moderna series) 10/21/2021    Lipid screen  04/26/2022    Potassium monitoring  05/12/2022    Creatinine monitoring  05/12/2022    A1C test (Diabetic or Prediabetic)  05/26/2022    Depression Screen  05/26/2022    DTaP/Tdap/Td vaccine (2 - Td or Tdap) 06/06/2029    Hepatitis C screen  Completed    HIV screen  Completed    Hepatitis A vaccine  Aged Out    Hib vaccine  Aged Out    Meningococcal (ACWY) vaccine  Aged Out             (applicable per patient's age: Cancer Screenings, Depression Screening, Fall Risk Screening, Immunizations)    Hemoglobin A1C (%)   Date Value   05/26/2021 6.2   02/08/2021 9.3   01/23/2020 5.9     Microalb/Crt. Ratio (mcg/mg creat)   Date Value   07/31/2017 18     LDL Cholesterol (mg/dL)   Date Value   04/26/2021 69     LDL Calculated (mg/dL)   Date Value   02/25/2020 97     AST (U/L)   Date Value   05/12/2021 25     ALT (U/L)   Date Value   05/12/2021 21     BUN (mg/dL)   Date Value   05/12/2021 23 (H)      (goal A1C is < 7)   (goal LDL is <100) need 30-50% reduction from baseline     BP Readings from Last 3 Encounters:   05/26/21 110/77   05/12/21 133/81   02/08/21 119/78    (goal /80)      All Future Testing planned in [facility]:  Lab Frequency Next Occurrence   E[facility] DIGITAL SCREEN W OR WO CAD BILATERAL Once 02/08/2021       Next Visit Date:  No future appointments.          Patient Active Problem List:     Hyperlipidemia     DM (diabetes mellitus) ([address] Hypertriglyceridemia     CRF (chronic renal failure)     Asthma     HTN (hypertension)

## 2022-05-10 ENCOUNTER — TELEPHONE (OUTPATIENT)
Dept: FAMILY MEDICINE CLINIC | Age: 57
End: 2022-05-10

## 2022-05-10 NOTE — TELEPHONE ENCOUNTER
Outreach call to f/u on cologuard kit. Spoke to client and states we have kits, not sure how old. Instructed to check date on box and call company or office if need new kit. Please complete asap. Óscar she works 3rd shift.

## 2022-06-07 DIAGNOSIS — Z12.11 SCREEN FOR COLON CANCER: Primary | ICD-10-CM

## 2022-06-30 NOTE — TELEPHONE ENCOUNTER
---    Vinicius Mendenhall is calling to request a refill on the following medication(s):    Last Visit Date (If Applicable):  3/82/1975    Next Visit Date:    Visit date not found    Medication Request:  Requested Prescriptions     Pending Prescriptions Disp Refills    bumetanide (BUMEX) 1 MG tablet 90 tablet 3     Sig: TAKE oneTABLET DAILY altered mental status

## 2022-07-21 ENCOUNTER — TELEMEDICINE (OUTPATIENT)
Dept: FAMILY MEDICINE CLINIC | Age: 57
End: 2022-07-21
Payer: COMMERCIAL

## 2022-07-21 DIAGNOSIS — R05.9 COUGH: Primary | ICD-10-CM

## 2022-07-21 PROCEDURE — 99213 OFFICE O/P EST LOW 20 MIN: CPT | Performed by: FAMILY MEDICINE

## 2022-07-21 RX ORDER — BENZONATATE 200 MG/1
200 CAPSULE ORAL 3 TIMES DAILY PRN
Qty: 30 CAPSULE | Refills: 0 | Status: SHIPPED | OUTPATIENT
Start: 2022-07-21 | End: 2022-07-28

## 2022-07-21 RX ORDER — LEVOFLOXACIN 500 MG/1
500 TABLET, FILM COATED ORAL DAILY
Qty: 10 TABLET | Refills: 0 | Status: SHIPPED | OUTPATIENT
Start: 2022-07-21 | End: 2022-07-31

## 2022-07-21 ASSESSMENT — PATIENT HEALTH QUESTIONNAIRE - PHQ9
SUM OF ALL RESPONSES TO PHQ9 QUESTIONS 1 & 2: 0
2. FEELING DOWN, DEPRESSED OR HOPELESS: 0
SUM OF ALL RESPONSES TO PHQ QUESTIONS 1-9: 0
1. LITTLE INTEREST OR PLEASURE IN DOING THINGS: 0
SUM OF ALL RESPONSES TO PHQ QUESTIONS 1-9: 0

## 2022-07-21 NOTE — PROGRESS NOTES
route daily  Rafaela Barajas, DO   Lancets MISC 1 each by Does not apply route 2 times daily  Rafaela Barajas, DO   blood glucose monitor strips Test 2 times a day & as needed for symptoms of irregular blood glucose. Dispense sufficient amount for indicated testing frequency plus additional to accommodate PRN testing needs. Rafaela Barajas, DO   Elastic Bandages & Supports (WRIST SPLINT/COCK-UP/LEFT L) MISC 1 Device by Does not apply route nightly Size to fit  Rafaela Barajas, DO   diclofenac sodium (VOLTAREN) 1 % GEL APPLY 4 GRAMS TOPICALLY FOUR TIMES A DAY  Rafaela Barajas, DO   fluconazole (DIFLUCAN) 150 MG tablet May repeat in 3-5 days, if symptoms persist or recur. Rafaela Barajas, DO   Elastic Bandages & Supports (WRIST SPLINT/COCK-UP/RIGHT L) MISC 1 Units by Does not apply route nightly  Patient not taking: Reported on 2021  Rafaela Barajas DO   TRULICITY 1.5 VV/1.1ZF SOPN INJECT 1.5   MG UNDER THE SKIN ONCE A WEEK  Patient not taking: Reported on 2021  Rafaela Barajas DO   LifeScan Unistik II Lancets MISC Test 2-3 times daily. Please dispense lifescan test strips #200 3 refills  Patient not taking: Reported on 2021  Rafaela Barajas, DO   ONETOUCH VERIO strip 1 each by In Vitro route daily As needed.   Patient not taking: Reported on 2021  Rafaela Barajas DO   albuterol sulfate HFA (PROVENTIL HFA) 108 (90 BASE) MCG/ACT inhaler Inhale 2 puffs into the lungs every 6 hours as needed for Wheezing  Patient not taking: Reported on 2021  Rafaela Barajas DO       Social History     Tobacco Use    Smoking status: Every Day     Packs/day: 0.80     Years: 30.00     Pack years: 24.00     Types: Cigarettes    Smokeless tobacco: Never   Substance Use Topics    Alcohol use: No    Drug use: No        No Known Allergies,   Past Medical History:   Diagnosis Date    DM (diabetes mellitus) ([facility] [address]     Hyperlipidemia     Hypertension    ,   Past Surgical History:   Procedure Laterality Date    BLADDER SURGERY       SECTION      x2    TONSILLECTOMY     ,   Social History     Tobacco Use    Smoking status: Every Day     Packs/day: 0.80     Years: 30.00     Pack years: 24.00     Types: Cigarettes    Smokeless tobacco: Never   Substance Use Topics    Alcohol use: No    Drug use: No   ,   Family History   Problem Relation Age of Onset    Other Mother         sleep apnea    Cancer Father     Kidney Disease Sister     Other Sister         obesity   ,   Immunization History   Administered Date(s) Administered    COVID-19, MODERNA BLUE border, Primary or Immunocompromised, (age 12y+), IM, 100 mcg/0.5mL 04/23/2021, 05/21/2021    Influenza, Quadv, IM, PF (6 mo and older Fluzone, Flulaval, Fluarix, and 3 yrs and older Afluria) 10/18/2018, 01/23/2020    Pneumococcal Conjugate 13-valent (Alifvfc12) 06/06/2019    Tdap (Boostrix, Adacel) 06/06/2019       PHYSICAL EXAMINATION:  [ INSTRUCTIONS:  \"[x]\" Indicates a positive item  \"[]\" Indicates a negative item  -- DELETE ALL ITEMS NOT EXAMINED]  Vital Signs: (As obtained by patient/caregiver or practitioner observation)    Blood pressure-  Heart rate-    Respiratory rate-    Temperature-  Pulse oximetry-     Constitutional: [x] Appears well-developed and well-nourished [x] No apparent distress      [] Abnormal-   Mental status  [x] Alert and awake  [x] Oriented to person/place/time [x]Able to follow commands      Eyes:  EOM    [x]  Normal  [] Abnormal-  Sclera  [x]  Normal  [] Abnormal -         Discharge [x]  None visible  [] Abnormal -    HENT:   [x] Normocephalic, atraumatic.   [] Abnormal   [x] Mouth/Throat: Mucous membranes are moist.     External Ears [x] Normal  [] Abnormal-     Neck: [x] No visualized mass     Pulmonary/Chest: [x] Respiratory effort normal.  [x] No visualized signs of difficulty breathing or respiratory distress        [] Abnormal-      Musculoskeletal:   [x] Normal gait with no signs of ataxia         [x] Normal range of motion of neck        [] Abnormal- Neurological:        [x] No Facial Asymmetry (Cranial nerve 7 motor function) (limited exam to video visit)          [x] No gaze palsy        [] Abnormal-         Skin:        [x] No significant exanthematous lesions or discoloration noted on facial skin         [] Abnormal-            Psychiatric:       [x] Normal Affect [x] No Hallucinations        [] Abnormal-     Other pertinent observable physical exam findings-     ASSESSMENT/PLAN:   Diagnosis Orders   1. Cough  COVID-19 Antibody Test (FASTEP COVID-19 RAPID TEST) KIT    levoFLOXacin (LEVAQUIN) 500 MG tablet    benzonatate (TESSALON) 200 MG capsule           Return if symptoms worsen or fail to improve. Raegan Guerrero is a 61 y.o. female being evaluated by a Virtual Visit (video visit) encounter to address concerns as mentioned above. A caregiver was present when appropriate. Due to this being a TeleHealth encounter (During NPYDN-91 public health emergency), evaluation of the following organ systems was limited: Vitals/Constitutional/EENT/Resp/CV/GI//MS/Neuro/Skin/Heme-Lymph-Imm. Pursuant to the emergency declaration under the [address] [place] [address], [address] [place] [address] authority and the [employer] Act, this Virtual Visit was conducted with patient's (and/or legal guardian's) consent, to reduce the patient's risk of exposure to COVID-19 and provide necessary medical care. The patient (and/or legal guardian) has also been advised to contact this office for worsening conditions or problems, and seek emergency medical treatment and/or call 911 if deemed necessary. Patient identification was verified at the start of the visit: Yes    Total time spent on this encounter: Not billed by time    Services were provided through a video synchronous discussion virtually to substitute for in-person clinic visit. Patient and provider were located at their individual homes.     --Grazyna Lopes BK Barajas DO on 7/21/2022 at 4:06 PM    An electronic signature was used to authenticate this note.

## 2022-07-25 DIAGNOSIS — J45.30 MILD PERSISTENT ASTHMA WITHOUT COMPLICATION: ICD-10-CM

## 2022-07-25 DIAGNOSIS — E11.65 TYPE 2 DIABETES MELLITUS WITH HYPERGLYCEMIA, WITHOUT LONG-TERM CURRENT USE OF INSULIN (HCC): ICD-10-CM

## 2022-07-26 RX ORDER — BUMETANIDE 1 MG/1
TABLET ORAL
Qty: 90 TABLET | Refills: 3 | Status: SHIPPED | OUTPATIENT
Start: 2022-07-26

## 2022-07-26 RX ORDER — DULAGLUTIDE 4.5 MG/.5ML
INJECTION, SOLUTION SUBCUTANEOUS
Qty: 6 ML | Refills: 3 | Status: SHIPPED | OUTPATIENT
Start: 2022-07-26

## 2022-07-27 ENCOUNTER — TELEPHONE (OUTPATIENT)
Dept: FAMILY MEDICINE CLINIC | Age: 57
End: 2022-07-27

## 2022-07-27 NOTE — TELEPHONE ENCOUNTER
Called and spoke with patient about completing Cologuard kit and she states that she and her  Fabricio Justin. Are gong to do it next week at and send it back in.

## 2022-08-24 ENCOUNTER — TELEPHONE (OUTPATIENT)
Dept: FAMILY MEDICINE CLINIC | Age: 57
End: 2022-08-24

## 2022-08-24 NOTE — TELEPHONE ENCOUNTER
Patient tested positive for Covid today . Her symptoms are running nose , chills and fatigue . Patient denied any other symptoms at this time . Patient is requesting antiviral medication .  Please advise, Thank you

## 2022-09-06 DIAGNOSIS — I10 ESSENTIAL HYPERTENSION: ICD-10-CM

## 2022-09-08 RX ORDER — LISINOPRIL 10 MG/1
TABLET ORAL
Qty: 90 TABLET | Refills: 3 | Status: SHIPPED | OUTPATIENT
Start: 2022-09-08

## 2022-10-24 DIAGNOSIS — E78.1 HYPERTRIGLYCERIDEMIA: ICD-10-CM

## 2022-10-24 DIAGNOSIS — J45.30 MILD PERSISTENT ASTHMA WITHOUT COMPLICATION: ICD-10-CM

## 2022-10-24 RX ORDER — FENOFIBRATE 134 MG/1
CAPSULE ORAL
Qty: 90 CAPSULE | Refills: 3 | Status: SHIPPED | OUTPATIENT
Start: 2022-10-24

## 2022-10-24 NOTE — TELEPHONE ENCOUNTER
Nate Bowman is calling to request a refill on the following medication(s):    Last Visit Date (If Applicable):  8/62/2517    Next Visit Date:    Visit date not found    Medication Request:  Requested Prescriptions     Pending Prescriptions Disp Refills    fenofibrate micronized (LOFIBRA) 134 MG capsule [Pharmacy Med Name: FENOFIBRATE MICRONIZED 134 MG ORAL CAPSULE] 90 capsule 3     Sig: Take 1 capsule by mouth daily

## 2023-01-19 DIAGNOSIS — E11.65 TYPE 2 DIABETES MELLITUS WITH HYPERGLYCEMIA, WITHOUT LONG-TERM CURRENT USE OF INSULIN (HCC): ICD-10-CM

## 2023-01-19 RX ORDER — DULAGLUTIDE 4.5 MG/.5ML
4.5 INJECTION, SOLUTION SUBCUTANEOUS WEEKLY
Qty: 4.5 ML | Refills: 0 | Status: SHIPPED | OUTPATIENT
Start: 2023-01-19

## 2023-01-19 NOTE — TELEPHONE ENCOUNTER
Lul Lazar is calling to request a refill on the following medication(s):    Last Visit Date (If Applicable):  4/92/2209    Next Visit Date:    Visit date not found    Medication Request:  Requested Prescriptions     Pending Prescriptions Disp Refills    TRULICITY 4.5 GJ/5.1GV SOPN 4.5 mL 0     Sig: Inject 4.5 mg into the skin once a week

## 2023-01-23 ENCOUNTER — TELEPHONE (OUTPATIENT)
Dept: FAMILY MEDICINE CLINIC | Age: 58
End: 2023-01-23

## 2023-01-23 NOTE — TELEPHONE ENCOUNTER
[facility] called the office on 1/23/23 and wanted clarification of patient's Trulicity 4.5 MA/0.1 ml pens. The caller stated the dispense quantity did not make sense. Writer went and clarified with Dr. Mayelin Dietrich. Script should be 90 days 6.0 ml with no refills. Caller verbalized understanding, got the writer's name, thanked the writer, and ended the call.

## 2023-05-08 DIAGNOSIS — N18.1 CRF (CHRONIC RENAL FAILURE), STAGE 1: ICD-10-CM

## 2023-05-08 DIAGNOSIS — J45.30 MILD PERSISTENT ASTHMA WITHOUT COMPLICATION: ICD-10-CM

## 2023-05-08 DIAGNOSIS — I10 ESSENTIAL HYPERTENSION: ICD-10-CM

## 2023-05-08 RX ORDER — SPIRONOLACTONE 25 MG/1
TABLET ORAL
Qty: 90 TABLET | Refills: 0 | Status: SHIPPED | OUTPATIENT
Start: 2023-05-08

## 2023-05-08 NOTE — TELEPHONE ENCOUNTER
Lavanda Lundborg is calling to request a refill on the following medication(s):    Last Visit Date (If Applicable):  5/32/1049    Next Visit Date:    Visit date not found    Medication Request:  Requested Prescriptions     Pending Prescriptions Disp Refills    spironolactone (ALDACTONE) 25 MG tablet [Pharmacy Med Name: SPIRONOLACTONE 25 MG ORAL TABLET] 90 tablet 3     Sig: Take 1 tablet daily.

## 2023-08-17 DIAGNOSIS — J45.30 MILD PERSISTENT ASTHMA WITHOUT COMPLICATION: ICD-10-CM

## 2023-08-17 DIAGNOSIS — I10 ESSENTIAL HYPERTENSION: ICD-10-CM

## 2023-08-17 DIAGNOSIS — N18.1 CRF (CHRONIC RENAL FAILURE), STAGE 1: ICD-10-CM

## 2023-08-18 RX ORDER — BUMETANIDE 1 MG/1
TABLET ORAL
Qty: 90 TABLET | Refills: 3 | Status: SHIPPED | OUTPATIENT
Start: 2023-08-18

## 2023-08-18 RX ORDER — SPIRONOLACTONE 25 MG/1
TABLET ORAL
Qty: 90 TABLET | Refills: 0 | Status: SHIPPED | OUTPATIENT
Start: 2023-08-18

## 2023-08-18 RX ORDER — DULAGLUTIDE 4.5 MG/.5ML
INJECTION, SOLUTION SUBCUTANEOUS
Qty: 6 ML | Refills: 5 | Status: SHIPPED | OUTPATIENT
Start: 2023-08-18

## 2023-08-18 NOTE — TELEPHONE ENCOUNTER
(Patient not taking: Reported on 2/8/2021) 1 each 0    TRULICITY 1.5 MICHAEL/6.2JT SOPN INJECT 1.5   MG UNDER THE SKIN ONCE A WEEK (Patient not taking: Reported on 5/26/2021) 6 mL 4    LifeScan Unistik II Lancets MISC Test 2-3 times daily. Please dispense lifescan test strips #200 3 refills (Patient not taking: Reported on 2/8/2021) 200 each 3    ONETOUCH VERIO strip 1 each by In Vitro route daily As needed. (Patient not taking: Reported on 2/8/2021) 100 each 3    albuterol sulfate HFA (PROVENTIL HFA) 108 (90 BASE) MCG/ACT inhaler Inhale 2 puffs into the lungs every 6 hours as needed for Wheezing (Patient not taking: Reported on 5/26/2021) 3 Inhaler 3     No current facility-administered medications on file prior to visit.

## 2023-08-24 NOTE — TELEPHONE ENCOUNTER
Current Outpatient Medications on File Prior to Visit   Medication Sig Dispense Refill    spironolactone (ALDACTONE) 25 MG tablet Take 1 tablet daily. 90 tablet 0    TRULICITY 4.5 AC/5.4DY SOPN Inject 4.5 mg into the skin once a week 4.5 mL 0    fenofibrate micronized (LOFIBRA) 134 MG capsule Take 1 capsule by mouth daily 90 capsule 3    lisinopril (PRINIVIL;ZESTRIL) 10 MG tablet TAKE 1 TABLET DAILY 90 tablet 3    bumetanide (BUMEX) 1 MG tablet Take 1 tablet daily. 90 tablet 3    TRULICITY 4.5 SE/0.8HU SOPN INJECT 4.5 MG UNDER THE SKIN ONCE A WEEK 6 mL 3    COVID-19 Antibody Test (FASTEP COVID-19 RAPID TEST) KIT 1 each by In Vitro route Daily with lunch 1 kit 1    albuterol sulfate  (90 Base) MCG/ACT inhaler Inhale 2 puffs into the lungs every 4 hours as needed for Wheezing or Shortness of Breath 3 Inhaler 3    nicotine polacrilex (NICORETTE) 2 MG gum Take 1 each by mouth as needed for Smoking cessation Maximum dose: 24 pieces/24 hours. 330 each 3    tiZANidine (ZANAFLEX) 4 MG tablet Take 1 tablet by mouth every 8 hours as needed (back pain) (Patient not taking: Reported on 5/26/2021) 15 tablet 0    glucose monitoring kit (FREESTYLE) monitoring kit 1 kit by Does not apply route daily 1 kit 0    Lancets MISC 1 each by Does not apply route 2 times daily 300 each 5    blood glucose monitor strips Test 2 times a day & as needed for symptoms of irregular blood glucose. Dispense sufficient amount for indicated testing frequency plus additional to accommodate PRN testing needs. 300 strip 0    Elastic Bandages & Supports (WRIST SPLINT/COCK-UP/LEFT L) MISC 1 Device by Does not apply route nightly Size to fit 1 each 0    diclofenac sodium (VOLTAREN) 1 % GEL APPLY 4 GRAMS TOPICALLY FOUR TIMES A  g 10    fluconazole (DIFLUCAN) 150 MG tablet May repeat in 3-5 days, if symptoms persist or recur.  30 tablet 1    Elastic Bandages & Supports (WRIST SPLINT/COCK-UP/RIGHT L) MISC 1 Units by Does not apply route nightly How Many Mls Were Removed From The 10 Mg/Ml (5ml) Vial When Preparing The Injectable Solution?: 0

## 2023-11-17 DIAGNOSIS — I10 ESSENTIAL HYPERTENSION: ICD-10-CM

## 2023-11-17 DIAGNOSIS — N18.1 CRF (CHRONIC RENAL FAILURE), STAGE 1: ICD-10-CM

## 2023-11-17 DIAGNOSIS — E78.1 HYPERTRIGLYCERIDEMIA: ICD-10-CM

## 2023-11-17 DIAGNOSIS — J45.30 MILD PERSISTENT ASTHMA WITHOUT COMPLICATION: ICD-10-CM

## 2023-11-17 RX ORDER — LISINOPRIL 10 MG/1
TABLET ORAL
Qty: 90 TABLET | Refills: 3 | Status: SHIPPED | OUTPATIENT
Start: 2023-11-17

## 2023-11-17 RX ORDER — FENOFIBRATE 134 MG/1
CAPSULE ORAL
Qty: 90 CAPSULE | Refills: 3 | Status: SHIPPED | OUTPATIENT
Start: 2023-11-17

## 2023-11-17 RX ORDER — SPIRONOLACTONE 25 MG/1
TABLET ORAL
Qty: 90 TABLET | Refills: 0 | Status: SHIPPED | OUTPATIENT
Start: 2023-11-17

## 2023-11-17 NOTE — TELEPHONE ENCOUNTER
Felicitas Ramos is calling to request a refill on the following medication(s):    Last Visit Date (If Applicable):  4/54/7707    Next Visit Date:    Visit date not found    Medication Request:  Requested Prescriptions     Pending Prescriptions Disp Refills    spironolactone (ALDACTONE) 25 MG tablet [Pharmacy Med Name: SPIRONOLACTONE 25 MG ORAL TABLET] 90 tablet 0     Sig: Take 1 tablet by mouth daily.     lisinopril (PRINIVIL;ZESTRIL) 10 MG tablet [Pharmacy Med Name: LISINOPRIL 10 MG ORAL TABLET] 90 tablet 3     Sig: Take 1 tablet daily    fenofibrate micronized (LOFIBRA) 134 MG capsule [Pharmacy Med Name: FENOFIBRATE MICRONIZED 134 MG ORAL CAPSULE] 90 capsule 3     Sig: Take 1 capsule by mouth daily

## 2023-12-27 DIAGNOSIS — I10 ESSENTIAL HYPERTENSION: ICD-10-CM

## 2023-12-27 DIAGNOSIS — N18.1 CRF (CHRONIC RENAL FAILURE), STAGE 1: ICD-10-CM

## 2023-12-27 DIAGNOSIS — J45.30 MILD PERSISTENT ASTHMA WITHOUT COMPLICATION: ICD-10-CM

## 2023-12-28 RX ORDER — SPIRONOLACTONE 25 MG/1
TABLET ORAL
Qty: 90 TABLET | Refills: 0 | Status: SHIPPED | OUTPATIENT
Start: 2023-12-28

## 2023-12-28 NOTE — TELEPHONE ENCOUNTER
Current Outpatient Medications on File Prior to Visit   Medication Sig Dispense Refill    spironolactone (ALDACTONE) 25 MG tablet Take 1 tablet by mouth daily. 90 tablet 0    lisinopril (PRINIVIL;ZESTRIL) 10 MG tablet Take 1 tablet daily 90 tablet 3    fenofibrate micronized (LOFIBRA) 134 MG capsule Take 1 capsule by mouth daily 90 capsule 3    bumetanide (BUMEX) 1 MG tablet Take 1 tablet by mouth daily 90 tablet 3    TRULICITY 4.5 EP/3.1XH SOPN Inject 4.5 mg subcutaneously once a week 6 mL 5    TRULICITY 4.5 RX/8.0VI SOPN Inject 4.5 mg into the skin once a week 4.5 mL 0    TRULICITY 4.5 QV/5.6BY SOPN INJECT 4.5 MG UNDER THE SKIN ONCE A WEEK 6 mL 3    COVID-19 Antibody Test (FASTEP COVID-19 RAPID TEST) KIT 1 each by In Vitro route Daily with lunch 1 kit 1    albuterol sulfate  (90 Base) MCG/ACT inhaler Inhale 2 puffs into the lungs every 4 hours as needed for Wheezing or Shortness of Breath 3 Inhaler 3    nicotine polacrilex (NICORETTE) 2 MG gum Take 1 each by mouth as needed for Smoking cessation Maximum dose: 24 pieces/24 hours. 330 each 3    tiZANidine (ZANAFLEX) 4 MG tablet Take 1 tablet by mouth every 8 hours as needed (back pain) (Patient not taking: Reported on 5/26/2021) 15 tablet 0    glucose monitoring kit (FREESTYLE) monitoring kit 1 kit by Does not apply route daily 1 kit 0    Lancets MISC 1 each by Does not apply route 2 times daily 300 each 5    blood glucose monitor strips Test 2 times a day & as needed for symptoms of irregular blood glucose. Dispense sufficient amount for indicated testing frequency plus additional to accommodate PRN testing needs. 300 strip 0    Elastic Bandages & Supports (WRIST SPLINT/COCK-UP/LEFT L) MISC 1 Device by Does not apply route nightly Size to fit 1 each 0    diclofenac sodium (VOLTAREN) 1 % GEL APPLY 4 GRAMS TOPICALLY FOUR TIMES A  g 10    fluconazole (DIFLUCAN) 150 MG tablet May repeat in 3-5 days, if symptoms persist or recur.  30 tablet 1    Elastic

## 2024-01-04 ENCOUNTER — TELEPHONE (OUTPATIENT)
Dept: FAMILY MEDICINE CLINIC | Age: 59
End: 2024-01-04

## 2024-01-04 NOTE — TELEPHONE ENCOUNTER
nelly ([employer]) called in to see if we have received the PA fax for the patients medication TRULICITY 4.5MG/.5ML      Please advise

## 2024-02-25 DIAGNOSIS — J45.30 MILD PERSISTENT ASTHMA WITHOUT COMPLICATION: ICD-10-CM

## 2024-02-25 DIAGNOSIS — N18.1 CRF (CHRONIC RENAL FAILURE), STAGE 1: ICD-10-CM

## 2024-02-25 DIAGNOSIS — I10 ESSENTIAL HYPERTENSION: ICD-10-CM

## 2024-02-26 RX ORDER — SPIRONOLACTONE 25 MG/1
TABLET ORAL
Qty: 90 TABLET | Refills: 0 | Status: SHIPPED | OUTPATIENT
Start: 2024-02-26

## 2024-03-08 ENCOUNTER — TELEPHONE (OUTPATIENT)
Dept: FAMILY MEDICINE CLINIC | Age: 59
End: 2024-03-08

## 2024-03-08 NOTE — TELEPHONE ENCOUNTER
Unfortunately probably will not be able to do a PA because we have not seen her in 2 years and will need a current hemoglobin A1c so she will need to make an appointment

## 2024-03-10 SDOH — ECONOMIC STABILITY: TRANSPORTATION INSECURITY
IN THE PAST 12 MONTHS, HAS LACK OF TRANSPORTATION KEPT YOU FROM MEETINGS, WORK, OR FROM GETTING THINGS NEEDED FOR DAILY LIVING?: NO

## 2024-03-10 SDOH — ECONOMIC STABILITY: INCOME INSECURITY: HOW HARD IS IT FOR YOU TO PAY FOR THE VERY BASICS LIKE FOOD, HOUSING, MEDICAL CARE, AND HEATING?: NOT VERY HARD

## 2024-03-10 SDOH — ECONOMIC STABILITY: FOOD INSECURITY: WITHIN THE PAST 12 MONTHS, THE FOOD YOU BOUGHT JUST DIDN'T LAST AND YOU DIDN'T HAVE MONEY TO GET MORE.: NEVER TRUE

## 2024-03-10 SDOH — ECONOMIC STABILITY: HOUSING INSECURITY
IN THE LAST 12 MONTHS, WAS THERE A TIME WHEN YOU DID NOT HAVE A STEADY PLACE TO SLEEP OR SLEPT IN A SHELTER (INCLUDING NOW)?: NO

## 2024-03-10 SDOH — ECONOMIC STABILITY: FOOD INSECURITY: WITHIN THE PAST 12 MONTHS, YOU WORRIED THAT YOUR FOOD WOULD RUN OUT BEFORE YOU GOT MONEY TO BUY MORE.: NEVER TRUE

## 2024-03-10 ASSESSMENT — PATIENT HEALTH QUESTIONNAIRE - PHQ9
SUM OF ALL RESPONSES TO PHQ QUESTIONS 1-9: 0
1. LITTLE INTEREST OR PLEASURE IN DOING THINGS: NOT AT ALL
2. FEELING DOWN, DEPRESSED OR HOPELESS: NOT AT ALL
SUM OF ALL RESPONSES TO PHQ9 QUESTIONS 1 & 2: 0
SUM OF ALL RESPONSES TO PHQ9 QUESTIONS 1 & 2: 0
1. LITTLE INTEREST OR PLEASURE IN DOING THINGS: 0
SUM OF ALL RESPONSES TO PHQ QUESTIONS 1-9: 0
SUM OF ALL RESPONSES TO PHQ QUESTIONS 1-9: 0
2. FEELING DOWN, DEPRESSED OR HOPELESS: 0
SUM OF ALL RESPONSES TO PHQ QUESTIONS 1-9: 0

## 2024-03-13 ENCOUNTER — OFFICE VISIT (OUTPATIENT)
Dept: FAMILY MEDICINE CLINIC | Age: 59
End: 2024-03-13
Payer: COMMERCIAL

## 2024-03-13 VITALS
HEIGHT: 61 IN | OXYGEN SATURATION: 97 % | SYSTOLIC BLOOD PRESSURE: 109 MMHG | HEART RATE: 99 BPM | BODY MASS INDEX: 33.79 KG/M2 | DIASTOLIC BLOOD PRESSURE: 72 MMHG | WEIGHT: 179 LBS

## 2024-03-13 DIAGNOSIS — I10 ESSENTIAL HYPERTENSION: ICD-10-CM

## 2024-03-13 DIAGNOSIS — J45.30 MILD PERSISTENT ASTHMA WITHOUT COMPLICATION: ICD-10-CM

## 2024-03-13 DIAGNOSIS — E11.65 TYPE 2 DIABETES MELLITUS WITH HYPERGLYCEMIA, WITHOUT LONG-TERM CURRENT USE OF INSULIN (HCC): Primary | ICD-10-CM

## 2024-03-13 DIAGNOSIS — Z12.31 SCREENING MAMMOGRAM FOR BREAST CANCER: ICD-10-CM

## 2024-03-13 DIAGNOSIS — J30.1 SEASONAL ALLERGIC RHINITIS DUE TO POLLEN: ICD-10-CM

## 2024-03-13 DIAGNOSIS — E78.2 MIXED HYPERLIPIDEMIA: ICD-10-CM

## 2024-03-13 DIAGNOSIS — E78.1 HYPERTRIGLYCERIDEMIA: ICD-10-CM

## 2024-03-13 DIAGNOSIS — Z12.11 SCREEN FOR COLON CANCER: ICD-10-CM

## 2024-03-13 DIAGNOSIS — Z23 NEED FOR VACCINATION: ICD-10-CM

## 2024-03-13 DIAGNOSIS — Z78.0 POST-MENOPAUSAL: ICD-10-CM

## 2024-03-13 DIAGNOSIS — M25.50 ARTHRALGIA, UNSPECIFIED JOINT: ICD-10-CM

## 2024-03-13 DIAGNOSIS — E55.9 VITAMIN D DEFICIENCY: ICD-10-CM

## 2024-03-13 LAB — HBA1C MFR BLD: 6.6 %

## 2024-03-13 PROCEDURE — 90471 IMMUNIZATION ADMIN: CPT | Performed by: FAMILY MEDICINE

## 2024-03-13 PROCEDURE — 90677 PCV20 VACCINE IM: CPT | Performed by: FAMILY MEDICINE

## 2024-03-13 PROCEDURE — 3044F HG A1C LEVEL LT 7.0%: CPT | Performed by: FAMILY MEDICINE

## 2024-03-13 PROCEDURE — 3078F DIAST BP <80 MM HG: CPT | Performed by: FAMILY MEDICINE

## 2024-03-13 PROCEDURE — 3074F SYST BP LT 130 MM HG: CPT | Performed by: FAMILY MEDICINE

## 2024-03-13 PROCEDURE — 99214 OFFICE O/P EST MOD 30 MIN: CPT | Performed by: FAMILY MEDICINE

## 2024-03-13 PROCEDURE — 83036 HEMOGLOBIN GLYCOSYLATED A1C: CPT | Performed by: FAMILY MEDICINE

## 2024-03-13 RX ORDER — DULAGLUTIDE 4.5 MG/.5ML
4.5 INJECTION, SOLUTION SUBCUTANEOUS WEEKLY
Qty: 6 ML | Refills: 3 | Status: SHIPPED | OUTPATIENT
Start: 2024-03-13

## 2024-03-13 RX ORDER — ALBUTEROL SULFATE 90 UG/1
2 AEROSOL, METERED RESPIRATORY (INHALATION) EVERY 4 HOURS PRN
Qty: 3 EACH | Refills: 3 | Status: SHIPPED | OUTPATIENT
Start: 2024-03-13

## 2024-03-13 RX ORDER — BUMETANIDE 1 MG/1
TABLET ORAL
Qty: 90 TABLET | Refills: 3 | Status: SHIPPED | OUTPATIENT
Start: 2024-03-13

## 2024-03-13 RX ORDER — LEVOCETIRIZINE DIHYDROCHLORIDE 5 MG/1
5 TABLET, FILM COATED ORAL DAILY
Qty: 90 TABLET | Refills: 3 | Status: SHIPPED | OUTPATIENT
Start: 2024-03-13

## 2024-03-13 NOTE — PROGRESS NOTES
SOPN     Sig: Inject 4.5 mg into the skin once a week     Dispense:  6 mL     Refill:  3    albuterol sulfate HFA (PROVENTIL;VENTOLIN;PROAIR) 108 (90 Base) MCG/ACT inhaler     Sig: Inhale 2 puffs into the lungs every 4 hours as needed for Wheezing or Shortness of Breath     Dispense:  3 each     Refill:  3    bumetanide (BUMEX) 1 MG tablet     Sig: Take 1 tablet by mouth daily     Dispense:  90 tablet     Refill:  3    diclofenac sodium (VOLTAREN) 1 % GEL     Sig: Apply 4 g topically 4 times daily     Dispense:  300 g     Refill:  3    levocetirizine (XYZAL) 5 MG tablet     Sig: Take 1 tablet by mouth daily     Dispense:  90 tablet     Refill:  3   She needs a Prevnar 20  I highly recommend she get his shingles  She is to follow-up on all of the above studies that I have ordered return for Pap smear work on quitting smoking get an eye exam follow-up with her dentist and return as needed for any other complaints or problems    Electronically signed by Rafaela Barajas DO on 3/13/2024 at 9:07 AM

## 2024-03-25 ENCOUNTER — TELEPHONE (OUTPATIENT)
Dept: FAMILY MEDICINE CLINIC | Age: 59
End: 2024-03-25

## 2024-03-25 NOTE — TELEPHONE ENCOUNTER
Patient called is wondering if the office received the papers for the PA for her Trulicity. Please advise.

## 2024-03-25 NOTE — TELEPHONE ENCOUNTER
Patient called in stating she went to the ER due to having ear pain she was advised she has a ear infection and she is wanting to know if you are still able to do her pap smear due to her not being able to lay flat .      Please advise

## 2024-04-03 ENCOUNTER — HOSPITAL ENCOUNTER (OUTPATIENT)
Age: 59
Setting detail: SPECIMEN
Discharge: HOME | End: 2024-04-03

## 2024-04-03 ENCOUNTER — OFFICE VISIT (OUTPATIENT)
Dept: FAMILY MEDICINE CLINIC | Age: 59
End: 2024-04-03
Payer: COMMERCIAL

## 2024-04-03 VITALS
DIASTOLIC BLOOD PRESSURE: 77 MMHG | SYSTOLIC BLOOD PRESSURE: 116 MMHG | HEART RATE: 87 BPM | BODY MASS INDEX: 33.13 KG/M2 | OXYGEN SATURATION: 96 % | HEIGHT: 62 IN | WEIGHT: 180 LBS

## 2024-04-03 DIAGNOSIS — Z01.419 ENCOUNTER FOR GYNECOLOGICAL EXAMINATION WITHOUT ABNORMAL FINDING: ICD-10-CM

## 2024-04-03 DIAGNOSIS — Z01.419 ENCOUNTER FOR GYNECOLOGICAL EXAMINATION: Primary | ICD-10-CM

## 2024-04-03 DIAGNOSIS — R68.82 DECREASED LIBIDO: ICD-10-CM

## 2024-04-03 DIAGNOSIS — N95.2 VAGINAL ATROPHY: ICD-10-CM

## 2024-04-03 PROCEDURE — 99396 PREV VISIT EST AGE 40-64: CPT | Performed by: FAMILY MEDICINE

## 2024-04-03 PROCEDURE — 3078F DIAST BP <80 MM HG: CPT | Performed by: FAMILY MEDICINE

## 2024-04-03 PROCEDURE — 3074F SYST BP LT 130 MM HG: CPT | Performed by: FAMILY MEDICINE

## 2024-04-03 RX ORDER — MEDROXYPROGESTERONE ACETATE 2.5 MG/1
2.5 TABLET ORAL DAILY
Qty: 90 TABLET | Refills: 3 | Status: SHIPPED | OUTPATIENT
Start: 2024-04-03

## 2024-04-03 RX ORDER — ESTRADIOL 1 MG/1
1 TABLET ORAL DAILY
Qty: 90 TABLET | Refills: 3 | Status: SHIPPED | OUTPATIENT
Start: 2024-04-03

## 2024-04-03 NOTE — PROGRESS NOTES
negative stool   Extremities:   Extremities normal, atraumatic, no cyanosis or edema   Pulses:   2+ and symmetric all extremities   Skin:   Skin color, texture, turgor normal, no rashes or lesions   Lymph nodes:   Cervical, supraclavicular, and axillary nodes normal   Neurologic:   CNII-XII intact, normal strength, sensation and reflexes     throughout   .      Assessment:     1. Encounter for gynecological examination    2. Decreased libido    3. Vaginal atrophy         Healthy female exam.      Plan:      Orders Placed This Encounter   Procedures    PAP Smear     Requested Prescriptions     Signed Prescriptions Disp Refills    estradiol (ESTRACE) 1 MG tablet 90 tablet 3     Sig: Take 1 tablet by mouth daily    medroxyPROGESTERone (PROVERA) 2.5 MG tablet 90 tablet 3     Sig: Take 1 tablet by mouth daily        All questions answered  Await Pap smear results  Mammogram.

## 2024-04-04 LAB
ALBUMIN: 3.8 G/DL
ALK PHOSPHATASE: 81 U/L
ALT SERPL-CCNC: 19 U/L
AST SERPL-CCNC: 23 U/L
BASOPHILS ABSOLUTE: 0.11 X10^3UL
BASOPHILS RELATIVE PERCENT: 1.1 %
BILIRUB SERPL-MCNC: 0.5 MG/DL
BUN BLDV-MCNC: 31 MG/DL
CALCIUM SERPL-MCNC: 9.2 MG/DL
CHLORIDE BLD-SCNC: 107 MMOL/L
CHOLESTEROL/HDL RATIO: 3.7
CHOLESTEROL: 134 MG/DL
CO2: 27 MMOL/L
CREAT SERPL-MCNC: 1.09 MG/DL
CREATININE URINE: 188.2 MG/DL
EOSINOPHILS ABSOLUTE: 0.26 X10^3UL
EOSINOPHILS RELATIVE PERCENT: 2.6 %
ERYTHROCYTE [DISTWIDTH] IN BLOOD BY AUTOMATED COUNT: 47.7 FL
GFR AFRICAN AMERICAN: 62 ML/M1.7
GFR NON-AFRICAN AMERICAN: 51.2 ML/M1.7
GLUCOSE: 134 MG/DL
HCT VFR BLD CALC: 45.8 %
HDLC SERPL-MCNC: 36 MG/DL
HEMOGLOBIN: 14.8 G/DL
IMMATURE GRANULOCYTES %: 0.7 %
IMMATURE GRANULOCYTES ABSOLUTE: 0.07 X10^3UL
LDL CHOLESTEROL CALCULATED: 67 MG/DL
LYMPHOCYTES ABSOLUTE: 2.85 X10^3UL
LYMPHOCYTES RELATIVE PERCENT: 28.4 %
MCH RBC QN AUTO: 30.8 PG
MCHC RBC AUTO-ENTMCNC: 32.3 G/DL
MCV RBC AUTO: 95.4 FL
MONOCYTES ABSOLUTE: 0.54 X10^3UL
MONOCYTES RELATIVE PERCENT: 5.4 %
NEUTROPHILS ABSOLUTE: 6.21 X10^3UL
PLATELETS: 441 X10^3UL
POTASSIUM SERPL-SCNC: 4.4 MMOL/L
RBC: 4.8 X10^6UL
SEGMENTED NEUTROPHILS RELATIVE PERCENT: 61.8 %
SODIUM BLD-SCNC: 143 MMOL/L
T4 FREE: 1.59 UG/DL
TOTAL PROTEIN: 6.6 G/DL
TRIGL SERPL-MCNC: 153 MG/DL
TSH SERPL DL<=0.05 MIU/L-ACNC: 2.81 MIU/L
URINE TOTAL PROTEIN CREATININE RATIO: 27 MG/G CR
URINE TOTAL PROTEIN: 5 MG/DL
VITAMIN D 25-HYDROXY: 32.8 NG/ML
VLDLC SERPL CALC-MCNC: 31 MG/DL
WBC: 10.05 X10^3UL

## 2024-04-12 DIAGNOSIS — E11.65 TYPE 2 DIABETES MELLITUS WITH HYPERGLYCEMIA, WITHOUT LONG-TERM CURRENT USE OF INSULIN (HCC): ICD-10-CM

## 2024-04-12 RX ORDER — DULAGLUTIDE 4.5 MG/.5ML
4.5 INJECTION, SOLUTION SUBCUTANEOUS WEEKLY
Qty: 6 ML | Refills: 3 | Status: SHIPPED | OUTPATIENT
Start: 2024-04-12

## 2024-04-15 DIAGNOSIS — N18.1 CRF (CHRONIC RENAL FAILURE), STAGE 1: ICD-10-CM

## 2024-04-15 DIAGNOSIS — J45.30 MILD PERSISTENT ASTHMA WITHOUT COMPLICATION: ICD-10-CM

## 2024-04-15 DIAGNOSIS — I10 ESSENTIAL HYPERTENSION: ICD-10-CM

## 2024-04-15 LAB — CYTOLOGY REPORT: NORMAL

## 2024-04-16 RX ORDER — SPIRONOLACTONE 25 MG/1
TABLET ORAL
Qty: 90 TABLET | Refills: 0 | Status: SHIPPED | OUTPATIENT
Start: 2024-04-16

## 2024-04-16 NOTE — TELEPHONE ENCOUNTER
Robyn Oropeza is calling to request a refill on the following medication(s):    Last Visit Date (If Applicable):  4/3/2024    Next Visit Date:    Visit date not found    Medication Request:  Requested Prescriptions     Pending Prescriptions Disp Refills    spironolactone (ALDACTONE) 25 MG tablet [Pharmacy Med Name: SPIRONOLACTONE 25 MG ORAL TABLET] 90 tablet 0     Sig: Take 1 tablet by mouth daily

## 2024-04-20 ENCOUNTER — HOSPITAL ENCOUNTER (OUTPATIENT)
Dept: MAMMOGRAPHY | Age: 59
End: 2024-04-20
Attending: FAMILY MEDICINE
Payer: COMMERCIAL

## 2024-04-20 DIAGNOSIS — Z78.0 POST-MENOPAUSAL: ICD-10-CM

## 2024-04-20 DIAGNOSIS — Z12.31 SCREENING MAMMOGRAM FOR BREAST CANCER: ICD-10-CM

## 2024-04-20 PROCEDURE — 77080 DXA BONE DENSITY AXIAL: CPT

## 2024-04-20 PROCEDURE — 77063 BREAST TOMOSYNTHESIS BI: CPT

## 2024-09-01 LAB — NONINV COLON CA DNA+OCC BLD SCRN STL QL: POSITIVE

## 2024-09-03 DIAGNOSIS — R19.5 POSITIVE COLORECTAL CANCER SCREENING USING COLOGUARD TEST: Primary | ICD-10-CM

## 2024-09-05 RX ORDER — SODIUM, POTASSIUM,MAG SULFATES 17.5-3.13G
1 SOLUTION, RECONSTITUTED, ORAL ORAL ONCE
Qty: 1 EACH | Refills: 0 | Status: SHIPPED | OUTPATIENT
Start: 2024-09-05 | End: 2024-09-05

## 2024-09-05 NOTE — TELEPHONE ENCOUNTER
Procedure scheduled/Hamdani  Procedure: colonoscopy  Dx:positive cologuard   Ordering:Belkis Barajas DO  Date:09/16/24  Time:4:00 pm / arrival 2:30 pm  Hospital: [facility]  Bowel prep instructions sent to patient: Suprep  Patient advised by phone/mail via phone

## 2024-09-16 ENCOUNTER — ANESTHESIA EVENT (OUTPATIENT)
Dept: OPERATING ROOM | Age: 59
End: 2024-09-16
Payer: COMMERCIAL

## 2024-09-16 ENCOUNTER — HOSPITAL ENCOUNTER (OUTPATIENT)
Age: 59
Setting detail: OUTPATIENT SURGERY
Discharge: HOME | End: 2024-09-16
Attending: INTERNAL MEDICINE | Admitting: INTERNAL MEDICINE
Payer: COMMERCIAL

## 2024-09-16 ENCOUNTER — ANESTHESIA (OUTPATIENT)
Dept: OPERATING ROOM | Age: 59
End: 2024-09-16
Payer: COMMERCIAL

## 2024-09-16 VITALS
HEART RATE: 76 BPM | SYSTOLIC BLOOD PRESSURE: 108 MMHG | TEMPERATURE: 97.7 F | BODY MASS INDEX: 32.76 KG/M2 | WEIGHT: 178 LBS | RESPIRATION RATE: 20 BRPM | DIASTOLIC BLOOD PRESSURE: 68 MMHG | HEIGHT: 62 IN | OXYGEN SATURATION: 97 %

## 2024-09-16 DIAGNOSIS — R19.5 POSITIVE COLORECTAL CANCER SCREENING USING COLOGUARD TEST: ICD-10-CM

## 2024-09-16 LAB
GLUCOSE BLD-MCNC: 119 MG/DL (ref 65–105)
GLUCOSE BLD-MCNC: 96 MG/DL (ref 65–105)

## 2024-09-16 PROCEDURE — 7100000011 HC PHASE II RECOVERY - ADDTL 15 MIN: Performed by: INTERNAL MEDICINE

## 2024-09-16 PROCEDURE — 88305 TISSUE EXAM BY PATHOLOGIST: CPT

## 2024-09-16 PROCEDURE — 2500000003 HC RX 250 WO HCPCS: Performed by: NURSE ANESTHETIST, CERTIFIED REGISTERED

## 2024-09-16 PROCEDURE — 2580000003 HC RX 258

## 2024-09-16 PROCEDURE — 7100000010 HC PHASE II RECOVERY - FIRST 15 MIN: Performed by: INTERNAL MEDICINE

## 2024-09-16 PROCEDURE — 2709999900 HC NON-CHARGEABLE SUPPLY: Performed by: INTERNAL MEDICINE

## 2024-09-16 PROCEDURE — 3700000001 HC ADD 15 MINUTES (ANESTHESIA): Performed by: INTERNAL MEDICINE

## 2024-09-16 PROCEDURE — 3609010600 HC COLONOSCOPY POLYPECTOMY SNARE/COLD BIOPSY: Performed by: INTERNAL MEDICINE

## 2024-09-16 PROCEDURE — 82947 ASSAY GLUCOSE BLOOD QUANT: CPT

## 2024-09-16 PROCEDURE — 3700000000 HC ANESTHESIA ATTENDED CARE: Performed by: INTERNAL MEDICINE

## 2024-09-16 PROCEDURE — 6360000002 HC RX W HCPCS: Performed by: NURSE ANESTHETIST, CERTIFIED REGISTERED

## 2024-09-16 RX ORDER — SODIUM CHLORIDE 9 MG/ML
INJECTION, SOLUTION INTRAVENOUS CONTINUOUS
Status: DISCONTINUED | OUTPATIENT
Start: 2024-09-16 | End: 2024-09-16 | Stop reason: HOSPADM

## 2024-09-16 RX ORDER — SODIUM CHLORIDE 9 MG/ML
INJECTION, SOLUTION INTRAVENOUS PRN
Status: DISCONTINUED | OUTPATIENT
Start: 2024-09-16 | End: 2024-09-16 | Stop reason: HOSPADM

## 2024-09-16 RX ORDER — GLYCOPYRROLATE 0.2 MG/ML
INJECTION INTRAMUSCULAR; INTRAVENOUS
Status: DISCONTINUED | OUTPATIENT
Start: 2024-09-16 | End: 2024-09-16 | Stop reason: SDUPTHER

## 2024-09-16 RX ORDER — LIDOCAINE HYDROCHLORIDE 10 MG/ML
1 INJECTION, SOLUTION EPIDURAL; INFILTRATION; INTRACAUDAL; PERINEURAL
Status: DISCONTINUED | OUTPATIENT
Start: 2024-09-17 | End: 2024-09-16 | Stop reason: HOSPADM

## 2024-09-16 RX ORDER — SODIUM CHLORIDE, SODIUM LACTATE, POTASSIUM CHLORIDE, CALCIUM CHLORIDE 600; 310; 30; 20 MG/100ML; MG/100ML; MG/100ML; MG/100ML
INJECTION, SOLUTION INTRAVENOUS CONTINUOUS
Status: DISCONTINUED | OUTPATIENT
Start: 2024-09-16 | End: 2024-09-16 | Stop reason: HOSPADM

## 2024-09-16 RX ORDER — LIDOCAINE HYDROCHLORIDE 20 MG/ML
INJECTION, SOLUTION EPIDURAL; INFILTRATION; INTRACAUDAL; PERINEURAL
Status: DISCONTINUED | OUTPATIENT
Start: 2024-09-16 | End: 2024-09-16 | Stop reason: SDUPTHER

## 2024-09-16 RX ORDER — PROPOFOL 10 MG/ML
INJECTION, EMULSION INTRAVENOUS
Status: DISCONTINUED | OUTPATIENT
Start: 2024-09-16 | End: 2024-09-16 | Stop reason: SDUPTHER

## 2024-09-16 RX ORDER — SODIUM CHLORIDE 0.9 % (FLUSH) 0.9 %
5-40 SYRINGE (ML) INJECTION EVERY 12 HOURS SCHEDULED
Status: DISCONTINUED | OUTPATIENT
Start: 2024-09-16 | End: 2024-09-16 | Stop reason: HOSPADM

## 2024-09-16 RX ORDER — PHENYLEPHRINE HCL IN 0.9% NACL 1 MG/10 ML
SYRINGE (ML) INTRAVENOUS
Status: DISCONTINUED | OUTPATIENT
Start: 2024-09-16 | End: 2024-09-16 | Stop reason: SDUPTHER

## 2024-09-16 RX ORDER — SODIUM CHLORIDE 0.9 % (FLUSH) 0.9 %
5-40 SYRINGE (ML) INJECTION PRN
Status: DISCONTINUED | OUTPATIENT
Start: 2024-09-16 | End: 2024-09-16 | Stop reason: HOSPADM

## 2024-09-16 RX ADMIN — PROPOFOL 125 MCG/KG/MIN: 10 INJECTION, EMULSION INTRAVENOUS at 12:47

## 2024-09-16 RX ADMIN — Medication 100 MCG: at 13:04

## 2024-09-16 RX ADMIN — Medication 100 MCG: at 13:08

## 2024-09-16 RX ADMIN — GLYCOPYRROLATE 0.2 MG: 0.2 INJECTION INTRAMUSCULAR; INTRAVENOUS at 12:57

## 2024-09-16 RX ADMIN — Medication 100 MCG: at 13:15

## 2024-09-16 RX ADMIN — LIDOCAINE HYDROCHLORIDE 50 MG: 20 INJECTION, SOLUTION EPIDURAL; INFILTRATION; INTRACAUDAL; PERINEURAL at 12:47

## 2024-09-16 RX ADMIN — SODIUM CHLORIDE, POTASSIUM CHLORIDE, SODIUM LACTATE AND CALCIUM CHLORIDE: 600; 310; 30; 20 INJECTION, SOLUTION INTRAVENOUS at 11:35

## 2024-09-16 ASSESSMENT — PAIN - FUNCTIONAL ASSESSMENT
PAIN_FUNCTIONAL_ASSESSMENT: 0-10
PAIN_FUNCTIONAL_ASSESSMENT: FACE, LEGS, ACTIVITY, CRY, AND CONSOLABILITY (FLACC)

## 2024-09-18 LAB — SURGICAL PATHOLOGY REPORT: NORMAL

## 2024-09-20 DIAGNOSIS — G56.01 CARPAL TUNNEL SYNDROME ON RIGHT: ICD-10-CM

## 2024-09-20 DIAGNOSIS — G56.02 CARPAL TUNNEL SYNDROME OF LEFT WRIST: Primary | ICD-10-CM

## 2024-09-21 DIAGNOSIS — J45.30 MILD PERSISTENT ASTHMA WITHOUT COMPLICATION: ICD-10-CM

## 2024-09-23 RX ORDER — BUMETANIDE 1 MG/1
TABLET ORAL
Qty: 90 TABLET | Refills: 3 | Status: SHIPPED | OUTPATIENT
Start: 2024-09-23

## 2024-09-24 ENCOUNTER — TELEPHONE (OUTPATIENT)
Dept: GASTROENTEROLOGY | Age: 59
End: 2024-09-24

## 2024-09-24 RX ORDER — SODIUM, POTASSIUM,MAG SULFATES 17.5-3.13G
1 SOLUTION, RECONSTITUTED, ORAL ORAL ONCE
Qty: 1 EACH | Refills: 0 | Status: SHIPPED | OUTPATIENT
Start: 2024-09-24 | End: 2024-09-24

## 2024-10-04 ENCOUNTER — OFFICE VISIT (OUTPATIENT)
Dept: FAMILY MEDICINE CLINIC | Age: 59
End: 2024-10-04
Payer: COMMERCIAL

## 2024-10-04 VITALS
BODY MASS INDEX: 33.49 KG/M2 | HEART RATE: 90 BPM | DIASTOLIC BLOOD PRESSURE: 74 MMHG | OXYGEN SATURATION: 98 % | WEIGHT: 182 LBS | SYSTOLIC BLOOD PRESSURE: 119 MMHG | HEIGHT: 62 IN

## 2024-10-04 DIAGNOSIS — J06.9 ACUTE URI: Primary | ICD-10-CM

## 2024-10-04 DIAGNOSIS — J45.30 MILD PERSISTENT ASTHMA WITHOUT COMPLICATION: ICD-10-CM

## 2024-10-04 PROCEDURE — 3078F DIAST BP <80 MM HG: CPT | Performed by: FAMILY MEDICINE

## 2024-10-04 PROCEDURE — 99213 OFFICE O/P EST LOW 20 MIN: CPT | Performed by: FAMILY MEDICINE

## 2024-10-04 PROCEDURE — 3074F SYST BP LT 130 MM HG: CPT | Performed by: FAMILY MEDICINE

## 2024-10-04 RX ORDER — BENZONATATE 200 MG/1
200 CAPSULE ORAL 3 TIMES DAILY PRN
Qty: 30 CAPSULE | Refills: 0 | Status: SHIPPED | OUTPATIENT
Start: 2024-10-04 | End: 2024-10-11

## 2024-10-04 RX ORDER — DOXYCYCLINE HYCLATE 100 MG
100 TABLET ORAL 2 TIMES DAILY
Qty: 20 TABLET | Refills: 0 | Status: SHIPPED | OUTPATIENT
Start: 2024-10-04 | End: 2024-10-14

## 2024-10-04 RX ORDER — BUMETANIDE 1 MG/1
TABLET ORAL
Qty: 90 TABLET | Refills: 3 | Status: SHIPPED | OUTPATIENT
Start: 2024-10-04

## 2024-10-04 RX ORDER — FLUCONAZOLE 150 MG/1
150 TABLET ORAL ONCE
Qty: 2 TABLET | Refills: 0 | Status: SHIPPED | OUTPATIENT
Start: 2024-10-04 | End: 2024-10-04

## 2024-10-04 NOTE — PROGRESS NOTES
spironolactone (ALDACTONE) 25 MG tablet Take 1 tablet by mouth daily 90 tablet 0    estradiol (ESTRACE) 1 MG tablet Take 1 tablet by mouth daily 90 tablet 3    medroxyPROGESTERone (PROVERA) 2.5 MG tablet Take 1 tablet by mouth daily 90 tablet 3    VITAMIN D PO Take by mouth      albuterol sulfate HFA (PROVENTIL;VENTOLIN;PROAIR) 108 (90 Base) MCG/ACT inhaler Inhale 2 puffs into the lungs every 4 hours as needed for Wheezing or Shortness of Breath 3 each 3    diclofenac sodium (VOLTAREN) 1 % GEL Apply 4 g topically 4 times daily 300 g 3    levocetirizine (XYZAL) 5 MG tablet Take 1 tablet by mouth daily 90 tablet 3    lisinopril (PRINIVIL;ZESTRIL) 10 MG tablet Take 1 tablet daily 90 tablet 3    fenofibrate micronized (LOFIBRA) 134 MG capsule Take 1 capsule by mouth daily 90 capsule 3    TRULICITY 4.5 MG/0.5ML SOPN Inject 4.5 mg subcutaneously once a week 6 mL 5    glucose monitoring kit (FREESTYLE) monitoring kit 1 kit by Does not apply route daily 1 kit 0    Lancets MISC 1 each by Does not apply route 2 times daily 300 each 5    blood glucose monitor strips Test 2 times a day & as needed for symptoms of irregular blood glucose. Dispense sufficient amount for indicated testing frequency plus additional to accommodate PRN testing needs. 300 strip 0    Elastic Bandages & Supports (WRIST SPLINT/COCK-UP/LEFT L) MISC 1 Device by Does not apply route nightly Size to fit 1 each 0    diclofenac sodium (VOLTAREN) 1 % GEL APPLY 4 GRAMS TOPICALLY FOUR TIMES A  g 10    fluconazole (DIFLUCAN) 150 MG tablet May repeat in 3-5 days, if symptoms persist or recur. 30 tablet 1    albuterol sulfate HFA (PROVENTIL HFA) 108 (90 BASE) MCG/ACT inhaler Inhale 2 puffs into the lungs every 6 hours as needed for Wheezing 3 Inhaler 3    TRULICITY 4.5 MG/0.5ML SOPN Inject 4.5 mg into the skin once a week (Patient not taking: Reported on 10/4/2024) 6 mL 3    Docusate Calcium (STOOL SOFTENER PO) Take by mouth (Patient not taking: Reported on

## 2024-10-15 DIAGNOSIS — J45.30 MILD PERSISTENT ASTHMA WITHOUT COMPLICATION: ICD-10-CM

## 2024-10-15 RX ORDER — BUMETANIDE 1 MG/1
TABLET ORAL
Qty: 90 TABLET | Refills: 3 | Status: SHIPPED | OUTPATIENT
Start: 2024-10-15

## 2024-11-18 ENCOUNTER — ANESTHESIA (OUTPATIENT)
Dept: OPERATING ROOM | Age: 59
End: 2024-11-18
Payer: COMMERCIAL

## 2024-11-18 ENCOUNTER — ANESTHESIA EVENT (OUTPATIENT)
Dept: OPERATING ROOM | Age: 59
End: 2024-11-18
Payer: COMMERCIAL

## 2024-11-18 ENCOUNTER — HOSPITAL ENCOUNTER (OUTPATIENT)
Age: 59
Setting detail: OUTPATIENT SURGERY
Discharge: HOME | End: 2024-11-18
Attending: INTERNAL MEDICINE | Admitting: INTERNAL MEDICINE
Payer: COMMERCIAL

## 2024-11-18 VITALS
TEMPERATURE: 96.8 F | OXYGEN SATURATION: 94 % | SYSTOLIC BLOOD PRESSURE: 108 MMHG | RESPIRATION RATE: 22 BRPM | BODY MASS INDEX: 32.39 KG/M2 | HEIGHT: 62 IN | HEART RATE: 82 BPM | DIASTOLIC BLOOD PRESSURE: 72 MMHG | WEIGHT: 176 LBS

## 2024-11-18 DIAGNOSIS — K63.5 POLYP OF CECUM: ICD-10-CM

## 2024-11-18 LAB
GLUCOSE BLD-MCNC: 102 MG/DL (ref 65–105)
GLUCOSE BLD-MCNC: 107 MG/DL (ref 65–105)

## 2024-11-18 PROCEDURE — 2720000010 HC SURG SUPPLY STERILE: Performed by: INTERNAL MEDICINE

## 2024-11-18 PROCEDURE — 6360000002 HC RX W HCPCS: Performed by: SPECIALIST

## 2024-11-18 PROCEDURE — 2500000003 HC RX 250 WO HCPCS: Performed by: SPECIALIST

## 2024-11-18 PROCEDURE — 3700000001 HC ADD 15 MINUTES (ANESTHESIA): Performed by: INTERNAL MEDICINE

## 2024-11-18 PROCEDURE — 88305 TISSUE EXAM BY PATHOLOGIST: CPT

## 2024-11-18 PROCEDURE — 2580000003 HC RX 258: Performed by: ANESTHESIOLOGY

## 2024-11-18 PROCEDURE — 82947 ASSAY GLUCOSE BLOOD QUANT: CPT

## 2024-11-18 PROCEDURE — 7100000011 HC PHASE II RECOVERY - ADDTL 15 MIN: Performed by: INTERNAL MEDICINE

## 2024-11-18 PROCEDURE — 2709999900 HC NON-CHARGEABLE SUPPLY: Performed by: INTERNAL MEDICINE

## 2024-11-18 PROCEDURE — 3609010200 HC COLONOSCOPY ABLATION TUMOR POLYP/OTHER LES: Performed by: INTERNAL MEDICINE

## 2024-11-18 PROCEDURE — 7100000010 HC PHASE II RECOVERY - FIRST 15 MIN: Performed by: INTERNAL MEDICINE

## 2024-11-18 PROCEDURE — 3700000000 HC ANESTHESIA ATTENDED CARE: Performed by: INTERNAL MEDICINE

## 2024-11-18 PROCEDURE — 2500000003 HC RX 250 WO HCPCS: Performed by: INTERNAL MEDICINE

## 2024-11-18 PROCEDURE — 3609019800 HC COLONOSCOPY WITH SUBMUCOSAL INJECTION: Performed by: INTERNAL MEDICINE

## 2024-11-18 DEVICE — CLIP
Type: IMPLANTABLE DEVICE | Site: ASCENDING COLON | Status: FUNCTIONAL
Brand: RESOLUTION 360™ ULTRA CLIP

## 2024-11-18 RX ORDER — PROPOFOL 10 MG/ML
INJECTION, EMULSION INTRAVENOUS
Status: DISCONTINUED | OUTPATIENT
Start: 2024-11-18 | End: 2024-11-18 | Stop reason: SDUPTHER

## 2024-11-18 RX ORDER — METOCLOPRAMIDE HYDROCHLORIDE 5 MG/ML
10 INJECTION INTRAMUSCULAR; INTRAVENOUS
Status: DISCONTINUED | OUTPATIENT
Start: 2024-11-18 | End: 2024-11-18 | Stop reason: HOSPADM

## 2024-11-18 RX ORDER — SODIUM CHLORIDE 9 MG/ML
INJECTION, SOLUTION INTRAVENOUS CONTINUOUS
Status: DISCONTINUED | OUTPATIENT
Start: 2024-11-18 | End: 2024-11-18 | Stop reason: HOSPADM

## 2024-11-18 RX ORDER — OXYCODONE HYDROCHLORIDE 5 MG/1
5 TABLET ORAL
Status: DISCONTINUED | OUTPATIENT
Start: 2024-11-18 | End: 2024-11-18 | Stop reason: HOSPADM

## 2024-11-18 RX ORDER — SODIUM CHLORIDE 9 MG/ML
INJECTION, SOLUTION INTRAVENOUS PRN
Status: DISCONTINUED | OUTPATIENT
Start: 2024-11-18 | End: 2024-11-18 | Stop reason: HOSPADM

## 2024-11-18 RX ORDER — SODIUM CHLORIDE 0.9 % (FLUSH) 0.9 %
5-40 SYRINGE (ML) INJECTION PRN
Status: DISCONTINUED | OUTPATIENT
Start: 2024-11-18 | End: 2024-11-18 | Stop reason: HOSPADM

## 2024-11-18 RX ORDER — SODIUM CHLORIDE 0.9 % (FLUSH) 0.9 %
5-40 SYRINGE (ML) INJECTION EVERY 12 HOURS SCHEDULED
Status: DISCONTINUED | OUTPATIENT
Start: 2024-11-18 | End: 2024-11-18 | Stop reason: HOSPADM

## 2024-11-18 RX ORDER — FENTANYL CITRATE 50 UG/ML
25 INJECTION, SOLUTION INTRAMUSCULAR; INTRAVENOUS EVERY 5 MIN PRN
Status: DISCONTINUED | OUTPATIENT
Start: 2024-11-18 | End: 2024-11-18 | Stop reason: HOSPADM

## 2024-11-18 RX ORDER — LIDOCAINE HYDROCHLORIDE 20 MG/ML
INJECTION, SOLUTION EPIDURAL; INFILTRATION; INTRACAUDAL; PERINEURAL
Status: DISCONTINUED | OUTPATIENT
Start: 2024-11-18 | End: 2024-11-18 | Stop reason: SDUPTHER

## 2024-11-18 RX ORDER — HALOPERIDOL 5 MG/ML
1 INJECTION INTRAMUSCULAR
Status: DISCONTINUED | OUTPATIENT
Start: 2024-11-18 | End: 2024-11-18 | Stop reason: HOSPADM

## 2024-11-18 RX ORDER — LIDOCAINE HYDROCHLORIDE 10 MG/ML
1 INJECTION, SOLUTION EPIDURAL; INFILTRATION; INTRACAUDAL; PERINEURAL
Status: DISCONTINUED | OUTPATIENT
Start: 2024-11-19 | End: 2024-11-18 | Stop reason: HOSPADM

## 2024-11-18 RX ORDER — HYDROMORPHONE HYDROCHLORIDE 1 MG/ML
0.5 INJECTION, SOLUTION INTRAMUSCULAR; INTRAVENOUS; SUBCUTANEOUS EVERY 5 MIN PRN
Status: DISCONTINUED | OUTPATIENT
Start: 2024-11-18 | End: 2024-11-18 | Stop reason: HOSPADM

## 2024-11-18 RX ORDER — SODIUM CHLORIDE, SODIUM LACTATE, POTASSIUM CHLORIDE, CALCIUM CHLORIDE 600; 310; 30; 20 MG/100ML; MG/100ML; MG/100ML; MG/100ML
INJECTION, SOLUTION INTRAVENOUS CONTINUOUS
Status: DISCONTINUED | OUTPATIENT
Start: 2024-11-18 | End: 2024-11-18 | Stop reason: HOSPADM

## 2024-11-18 RX ORDER — NALOXONE HYDROCHLORIDE 0.4 MG/ML
INJECTION, SOLUTION INTRAMUSCULAR; INTRAVENOUS; SUBCUTANEOUS PRN
Status: DISCONTINUED | OUTPATIENT
Start: 2024-11-18 | End: 2024-11-18 | Stop reason: HOSPADM

## 2024-11-18 RX ADMIN — PROPOFOL 50 MG: 10 INJECTION, EMULSION INTRAVENOUS at 12:22

## 2024-11-18 RX ADMIN — PROPOFOL 50 MG: 10 INJECTION, EMULSION INTRAVENOUS at 12:12

## 2024-11-18 RX ADMIN — SODIUM CHLORIDE: 9 INJECTION, SOLUTION INTRAVENOUS at 11:16

## 2024-11-18 RX ADMIN — LIDOCAINE HYDROCHLORIDE 100 MG: 20 INJECTION, SOLUTION EPIDURAL; INFILTRATION; INTRACAUDAL; PERINEURAL at 12:03

## 2024-11-18 RX ADMIN — PROPOFOL 50 MG: 10 INJECTION, EMULSION INTRAVENOUS at 12:15

## 2024-11-18 RX ADMIN — PROPOFOL 50 MG: 10 INJECTION, EMULSION INTRAVENOUS at 12:26

## 2024-11-18 RX ADMIN — PROPOFOL 150 MG: 10 INJECTION, EMULSION INTRAVENOUS at 12:03

## 2024-11-18 RX ADMIN — PROPOFOL 50 MG: 10 INJECTION, EMULSION INTRAVENOUS at 12:18

## 2024-11-18 RX ADMIN — PROPOFOL 50 MG: 10 INJECTION, EMULSION INTRAVENOUS at 12:08

## 2024-11-18 ASSESSMENT — PAIN DESCRIPTION - DESCRIPTORS: DESCRIPTORS: BURNING

## 2024-11-18 ASSESSMENT — PAIN - FUNCTIONAL ASSESSMENT
PAIN_FUNCTIONAL_ASSESSMENT: FACE, LEGS, ACTIVITY, CRY, AND CONSOLABILITY (FLACC)
PAIN_FUNCTIONAL_ASSESSMENT: 0-10

## 2024-11-18 ASSESSMENT — LIFESTYLE VARIABLES: SMOKING_STATUS: 1

## 2024-11-18 NOTE — H&P
History and Physical Service   [facility]    HISTORY AND PHYSICAL EXAMINATION            Date of Evaluation: 11/18/2024  Patient name:  Robyn Oropeza  MRN:   [id number]  YOB: 1965  PCP:    Rafaela Barajas DO    History Obtained From:     Patient, medical records     History of Present Illness:     This is Robyn riggins 59 y.o. female who presents today for a COLONOSCOPY DIAGNOSTIC by Ayad Alas MD for Polyp of cecum.     The patient arrived and completed the prep as directed until watery clear. . Previous colonoscopy as below  No FH colon cancer. Patient denies bowel changes, bloody or tarry stools, diarrhea , positive constipation, no abdominal pain or unexplained weight loss.  Today no fever, chills, Smokers cough, no wheezing, increased SOB or chest pain.     Had colonoscopy completed 09/16/2024 with multiple polyps and findings as below.     Findings:      1.  Small subepithelial nodule was seen in the cecum.  Biopsies were done.  2.  6 mm sessile polyp was seen in the transverse colon.  Cold snare polypectomy was done.  Resection retrieval was complete.  3.  2 sessile polyps were seen in the sigmoid colon.  Polyps in size from 8 to 10 mm.  Cold snare polypectomies were done.  Resection retrieval was complete.  4.  2 sessile polyps were seen in the rectum.  Polyps in size from 5 to 7 mm.  Cold forcep and cold snare polypectomies were done.  Resection retrieval was complete.  5.  Internal hemorrhoids were seen retroflexion view     Recommendations: Follow-up with the biopsy results                                     Repeat colonoscopy in 3 years                                     Follow-up with PCP     Ayad Alas MD  Electronically signed by Ayad Alas MD on 9/16/2024 at 12:28 PM    -- Diagnosis --   A.  CECUM, BIOPSIES:   - ADENOMATOUS POLYP (TUBULAR ADENOMA).     B.  ASCENDING COLON, BIOPSIES:   - ADENOMATOUS POLYP (TUBULAR ADENOMA).

## 2024-11-18 NOTE — OP NOTE
Operative Note      Patient: Robyn Oropeza  YOB: 1965  MRN: [id number]    Date of Procedure: 11/18/2024    Pre-Op Diagnosis Codes:      * Polyp of cecum [K63.5]    Post-Op Diagnosis:  Cecal polyps, ascending colon polyp EMR, tattoo placement, descending and rectal polyps, sigmoid diverticulosis and internal hemorrhoids       Procedure(s):  COLONOSCOPY COLD FORCEPS POLYPECTOMY ASCENDING COLON EMR TATOOING    Surgeon(s):  Ayad Alas MD    Assistant:   * No surgical staff found *    Anesthesia: Monitor Anesthesia Care    Estimated Blood Loss (mL): Minimal    Complications: None    Specimens:   ID Type Source Tests Collected by Time Destination   A : CECUM POLYP Tissue Cecum SURGICAL PATHOLOGY Ayad Alas MD 11/18/2024 1210    B : ASCENDING COLON BX Tissue Colon-Ascending SURGICAL PATHOLOGY Ayad Alas MD 11/18/2024 1221    C : DESCENDING COLON POLYP Tissue Colon-Descending SURGICAL PATHOLOGY Ayad Alas MD 11/18/2024 1223    D : RECTAL POLYP Tissue Rectum SURGICAL PATHOLOGY Ayad Alas MD 11/18/2024 1229        Implants:  Implant Name Type Inv. Item Serial No.  Lot No. LRB No. Used Action   CLIP HEMO L235CM WRK CHN RENNY OPN 17MM BRAID CATH ROT - [id number]  CLIP HEMO L235CM WRK CHN RENNY OPN 17MM BRAID CATH ROT  [employer] [id number] N/A 2 Implanted         Drains: * No LDAs found *    Findings:  Infection Present At Time Of Surgery (PATOS) (choose all levels that have infection present):  No infection present        Scope withdrawal time: 19 min     Description of Procedure:  Informed consent was obtained from the patient after explanation of the procedure including indications, description of the procedure,  benefits and possible risks and complications of the procedure, and alternatives. Questions were answered.  The patient's history was reviewed and a directed physical examination was performed prior to the

## 2024-11-18 NOTE — ANESTHESIA PRE PROCEDURE
mL  1 mL IntraDERmal Once PRN Margot Eaton MD       • 0.9 % sodium chloride infusion   IntraVENous Continuous Margot Eaton MD       • lactated ringers infusion   IntraVENous Continuous Margot Eaton MD       • sodium chloride flush 0.9 % injection 5-40 mL  5-40 mL IntraVENous 2 times per day Margot Eaton MD       • sodium chloride flush 0.9 % injection 5-40 mL  5-40 mL IntraVENous PRN Margot Eaton MD       • 0.9 % sodium chloride infusion   IntraVENous PRN Margot Eaton MD           Allergies:  No Known Allergies    Problem List:    Patient Active Problem List   Diagnosis Code   • Hyperlipidemia E78.5   • DM (diabetes mellitus) (HCC) E11.9   • Hypertriglyceridemia E78.1   • CRF (chronic renal failure) N18.9   • Asthma J45.909   • HTN (hypertension) I10       Past Medical History:        Diagnosis Date   • Arthritis    • CHF (congestive heart failure) (HCC)     history of   • COPD (chronic obstructive pulmonary disease) (HCC)    • DM (diabetes mellitus) (HCC)    • Hyperlipidemia    • Hypertension        Past Surgical History:        Procedure Laterality Date   • BLADDER SURGERY     •  SECTION      x2   • COLONOSCOPY N/A 2024    COLONOSCOPY DIAGNOSTIC COLD SNARE AND COLD FORCEPS POLYPECTOMY, HOT SNARE POLYPECTOMY performed by Ayad Alas MD at [facility] OR   • TONSILLECTOMY         Social History:    Social History     Tobacco Use   • Smoking status: Every Day     Current packs/day: 0.80     Average packs/day: 0.8 packs/day for 36.0 years (30.0 ttl pk-yrs)     Types: Cigarettes   • Smokeless tobacco: Never   Substance Use Topics   • Alcohol use: No                                Ready to quit: Not Answered  Counseling given: Not Answered      Vital Signs (Current):   Vitals:    24 1050 24 1053   BP:  100/74   Pulse:  100   Resp:  16   Temp:  97.7 °F (36.5 °C)   TempSrc: Temporal    SpO2:  94%   Weight: 79.8

## 2024-11-18 NOTE — ANESTHESIA POSTPROCEDURE EVALUATION
Department of Anesthesiology  Postprocedure Note    Patient: Robyn Oropeza  MRN: [id number]  YOB: 1965  Date of evaluation: 11/18/2024    Procedure Summary       Date: 11/18/24 Room / Location: [address]    Anesthesia Start: 1200 Anesthesia Stop: 1238    Procedure: COLONOSCOPY COLD FORCEPS POLYPECTOMY ASCENDING COLON EMR TATOOING (Anus) Diagnosis:       Polyp of cecum      (Polyp of cecum [K63.5])    Surgeons: Ayad Alas MD Responsible Provider: Ella Lopez MD    Anesthesia Type: MAC ASA Status: 3            Anesthesia Type: MAC    Sherry Phase I: Sherry Score: 10    Sherry Phase II: Sherry Score: 10    Anesthesia Post Evaluation    Patient location during evaluation: PACU  Patient participation: complete - patient participated  Level of consciousness: awake  Airway patency: patent  Nausea & Vomiting: no nausea  Cardiovascular status: blood pressure returned to baseline  Respiratory status: acceptable  Hydration status: euvolemic  Comments: Multimodal analgesia pain management as indicated by procedure  Multimodal analgesia pain management approach  Pain management: adequate    No notable events documented.

## 2024-11-18 NOTE — DISCHARGE INSTRUCTIONS
[facility]    POST-ENDOSCOPY INSTRUCTIONS    1. ACTIVITY   No driving, operating machinery, or making important decisions for 24 hours.    Resume normal activity after 24 hours.  You may return to work after 24 hours.    2. DIET        .  Soft diet today advance as tolerated by tomorrow    3. MEDICATIONS    Resume your usual medications.     No NSAIDs for 2 weeks.  Take plain Tylenol for pain as needed    Do not consume alcohol, tranquilizers, or sleeping medications for 24 hour unless advised by your physician.                 4. PHYSICIAN FOLLOW-UP / INSTRUCTIONS    Please call the office/clinic in 10 days for biopsy results:      [  ] GI office:  [phone]          Follow up with your family physician as planned.    6. NORMAL CHANGES YOU MAY EXPERIENCE AFTER ENDOSCOPY:        COLONOSCOPY   Passing of gas for several hours.    Some mild abdominal cramping.                  You may feel fatigued for the next 24-48   hours due to the prep and sedation    7. CALL YOUR PHYSICIAN IF YOU EXPERIENCE ANY OF THE FOLLOWING      A.  Passing blood rectally or vomiting blood (color may be red or black)      B.  Severe abdominal pain or tenderness (that is not relieved by passing air)      C.  Fever, chills, or excessive sweating      D.  Persistent nausea or vomiting      E.  Redness or swelling at the IV site    If you have additional questions, PLEASE call your doctor or the [facility] GI Unit ([phone])

## 2024-11-19 ENCOUNTER — TELEPHONE (OUTPATIENT)
Dept: GASTROENTEROLOGY | Age: 59
End: 2024-11-19

## 2024-11-19 NOTE — TELEPHONE ENCOUNTER
----- Message from Dr. yAad Alas MD sent at 11/18/2024 12:37 PM EST -----  Repeat repeat colonoscopy in 6 months    TBS for colonoscopy May 2025  Staff message encountered

## 2024-11-20 LAB — SURGICAL PATHOLOGY REPORT: NORMAL

## 2024-12-18 ENCOUNTER — PATIENT MESSAGE (OUTPATIENT)
Dept: FAMILY MEDICINE CLINIC | Age: 59
End: 2024-12-18

## 2024-12-18 DIAGNOSIS — I10 ESSENTIAL HYPERTENSION: ICD-10-CM

## 2024-12-18 DIAGNOSIS — N95.2 VAGINAL ATROPHY: ICD-10-CM

## 2024-12-18 DIAGNOSIS — E78.1 HYPERTRIGLYCERIDEMIA: ICD-10-CM

## 2024-12-18 DIAGNOSIS — J30.1 SEASONAL ALLERGIC RHINITIS DUE TO POLLEN: ICD-10-CM

## 2024-12-18 DIAGNOSIS — N18.1 CRF (CHRONIC RENAL FAILURE), STAGE 1: ICD-10-CM

## 2024-12-18 DIAGNOSIS — J45.30 MILD PERSISTENT ASTHMA WITHOUT COMPLICATION: ICD-10-CM

## 2024-12-18 RX ORDER — ESTRADIOL 1 MG/1
1 TABLET ORAL DAILY
Qty: 90 TABLET | Refills: 3 | Status: SHIPPED | OUTPATIENT
Start: 2024-12-18

## 2024-12-18 RX ORDER — MEDROXYPROGESTERONE ACETATE 2.5 MG/1
2.5 TABLET ORAL DAILY
Qty: 90 TABLET | Refills: 3 | Status: SHIPPED | OUTPATIENT
Start: 2024-12-18

## 2024-12-18 RX ORDER — LEVOCETIRIZINE DIHYDROCHLORIDE 5 MG/1
5 TABLET, FILM COATED ORAL DAILY
Qty: 90 TABLET | Refills: 3 | Status: SHIPPED | OUTPATIENT
Start: 2024-12-18

## 2024-12-18 RX ORDER — SPIRONOLACTONE 25 MG/1
TABLET ORAL
Qty: 90 TABLET | Refills: 0 | Status: SHIPPED | OUTPATIENT
Start: 2024-12-18

## 2024-12-18 RX ORDER — FENOFIBRATE 134 MG/1
CAPSULE ORAL
Qty: 90 CAPSULE | Refills: 3 | Status: SHIPPED | OUTPATIENT
Start: 2024-12-18

## 2024-12-18 RX ORDER — LISINOPRIL 10 MG/1
TABLET ORAL
Qty: 90 TABLET | Refills: 3 | Status: SHIPPED | OUTPATIENT
Start: 2024-12-18

## 2024-12-18 RX ORDER — BUMETANIDE 1 MG/1
TABLET ORAL
Qty: 90 TABLET | Refills: 3 | Status: SHIPPED | OUTPATIENT
Start: 2024-12-18

## 2024-12-27 ENCOUNTER — OFFICE VISIT (OUTPATIENT)
Dept: FAMILY MEDICINE CLINIC | Age: 59
End: 2024-12-27

## 2024-12-27 VITALS
OXYGEN SATURATION: 98 % | WEIGHT: 181 LBS | BODY MASS INDEX: 33.37 KG/M2 | DIASTOLIC BLOOD PRESSURE: 75 MMHG | HEART RATE: 89 BPM | SYSTOLIC BLOOD PRESSURE: 105 MMHG | TEMPERATURE: 97.3 F

## 2024-12-27 DIAGNOSIS — G56.02 CARPAL TUNNEL SYNDROME OF LEFT WRIST: ICD-10-CM

## 2024-12-27 DIAGNOSIS — Z23 NEED FOR INFLUENZA VACCINATION: Primary | ICD-10-CM

## 2024-12-27 DIAGNOSIS — G56.01 CARPAL TUNNEL SYNDROME ON RIGHT: ICD-10-CM

## 2024-12-27 NOTE — PROGRESS NOTES
[facility]  [facility]  [address]  [address]  [place] [address]  Dept: [phone]      Robyn Oropeza is a 59 y.o. female who presents today for follow up on her  medical conditions as noted below.      Chief Complaint   Patient presents with    Wrist Pain     Bilateral  Requesting wrist splints       Patient Active Problem List:     Hyperlipidemia     DM (diabetes mellitus) (HCC)     Hypertriglyceridemia     CRF (chronic renal failure)     Asthma     HTN (hypertension)     Past Medical History:   Diagnosis Date    Arthritis     CHF (congestive heart failure) (HCC)     history of    COPD (chronic obstructive pulmonary disease) (HCC)     DM (diabetes mellitus) (HCC)     Hyperlipidemia     Hypertension       Past Surgical History:   Procedure Laterality Date    BLADDER SURGERY       SECTION      x2    COLONOSCOPY N/A 2024    COLONOSCOPY DIAGNOSTIC COLD SNARE AND COLD FORCEPS POLYPECTOMY, HOT SNARE POLYPECTOMY performed by Ayad Alas MD at [facility] OR    COLONOSCOPY N/A 2024    COLONOSCOPY COLD FORCEPS POLYPECTOMY ASCENDING COLON EMR TATOOING performed by Ayad Alas MD at [facility] OR    COLONOSCOPY N/A 2024    COLONOSCOPY TATTOOING performed by Ayad Alas MD at [facility] OR    TONSILLECTOMY       Family History   Problem Relation Age of Onset    Other Mother         sleep apnea    Cancer Father     Kidney Disease Sister     Other Sister         obesity       Current Outpatient Medications   Medication Sig Dispense Refill    Elastic Bandages & Supports (WRIST SPLINT/COCK-UP/LEFT L) MISC 1 Device by Does not apply route nightly 1 each 0    Elastic Bandages & Supports (WRIST SPLINT/COCK-UP/RIGHT L) MISC 1 Units by Does not apply route nightly 1 each 0    lisinopril (PRINIVIL;ZESTRIL) 10 MG tablet TAKE 1 TABLET DAILY 90 tablet 3    fenofibrate micronized (LOFIBRA) 134 MG capsule Take 1 capsule by mouth daily 90

## 2024-12-27 NOTE — PROGRESS NOTES
Vaccine Information Sheet, \"Influenza - Inactivated\"  given to Robyn Oropeza, or parent/legal guardian of  Robyn Oropeza and verbalized understanding.    Patient responses:    Have you ever had a reaction to a flu vaccine? No  Are you able to eat eggs without adverse effects?  Yes  Do you have any current illness?  No  Have you ever had Guillian Canute Syndrome?  No    Flu vaccine given per order. Please see immunization tab.

## 2025-02-04 DIAGNOSIS — M25.50 ARTHRALGIA, UNSPECIFIED JOINT: ICD-10-CM

## 2025-02-04 NOTE — TELEPHONE ENCOUNTER
Robyn Oropeza is calling to request a refill on the following medication(s):    Last Visit Date (If Applicable):  12/27/2024    Next Visit Date:    Visit date not found    Medication Request:  Requested Prescriptions     Pending Prescriptions Disp Refills    diclofenac sodium (VOLTAREN) 1 % GEL [Pharmacy Med Name: DICLOFENAC SODIUM 1% GEL] 300 g 3     Sig: APPLY 4 GRAMS TOPICALLY 4 TIMES DAILY

## 2025-03-31 DIAGNOSIS — I10 ESSENTIAL HYPERTENSION: ICD-10-CM

## 2025-03-31 DIAGNOSIS — J45.30 MILD PERSISTENT ASTHMA WITHOUT COMPLICATION: ICD-10-CM

## 2025-03-31 DIAGNOSIS — N18.1 CRF (CHRONIC RENAL FAILURE), STAGE 1: ICD-10-CM

## 2025-03-31 RX ORDER — SPIRONOLACTONE 25 MG/1
TABLET ORAL
Qty: 90 TABLET | Refills: 0 | Status: SHIPPED | OUTPATIENT
Start: 2025-03-31

## 2025-03-31 NOTE — TELEPHONE ENCOUNTER
Robyn Oropeza is calling to request a refill on the following medication(s):    Last Visit Date (If Applicable):  12/27/2024    Next Visit Date:    Visit date not found    Medication Request:  Requested Prescriptions     Pending Prescriptions Disp Refills    spironolactone (ALDACTONE) 25 MG tablet 90 tablet 0     Sig: Take 1 tablet by mouth daily.

## 2025-04-17 ENCOUNTER — TELEMEDICINE (OUTPATIENT)
Dept: FAMILY MEDICINE CLINIC | Age: 60
End: 2025-04-17

## 2025-04-17 DIAGNOSIS — E11.65 TYPE 2 DIABETES MELLITUS WITH HYPERGLYCEMIA, WITHOUT LONG-TERM CURRENT USE OF INSULIN (HCC): ICD-10-CM

## 2025-04-17 DIAGNOSIS — R31.0 GROSS HEMATURIA: Primary | ICD-10-CM

## 2025-04-17 NOTE — PROGRESS NOTES
Robyn Oropeza, was evaluated through a synchronous (real-time) audio-video encounter. The patient (or guardian if applicable) is aware that this is a billable service, which includes applicable co-pays. This Virtual Visit was conducted with patient's (and/or legal guardian's) consent. Patient identification was verified, and a caregiver was present when appropriate.   The patient was located at Home: [address] [facility] [address]  Provider was located at Home (Appt Dept State): [place]  Confirm you are appropriately licensed, registered, or certified to deliver care in the state where the patient is located as indicated above. If you are not or unsure, please re-schedule the visit: Yes, I confirm.     Robyn Oropeza (:  1965) is a Established patient, presenting virtually for evaluation of the following:      Below is the assessment and plan developed based on review of pertinent history, physical exam, labs, studies, and medications.     Assessment & Plan  Gross hematuria  She needs to come to the office tomorrow to give a urine specimen         Type 2 diabetes mellitus with hyperglycemia, without long-term current use of insulin (HCC)                     Subjective   HPI  Review of Systems     History of Present Illness  The patient presents via virtual visit for evaluation of blood in the urine.    An unusual sensation during urination was reported last night, characterized by a feeling of incomplete bladder emptying. Approximately 20 minutes after the initial visit to the bathroom, blood was noticed on the toilet paper. Diuretic medication is currently being taken, necessitating frequent urination approximately every 30 minutes. Over the course of 4 to 5 subsequent bathroom visits, small amounts of blood continued to be observed on the toilet paper post-urination. A peculiar sensation during urination is described, with uncertainty about whether this could be indicative of a urinary

## 2025-04-18 ENCOUNTER — HOSPITAL ENCOUNTER (OUTPATIENT)
Age: 60
Setting detail: SPECIMEN
Discharge: HOME | End: 2025-04-18

## 2025-04-18 ENCOUNTER — CLINICAL SUPPORT (OUTPATIENT)
Dept: FAMILY MEDICINE CLINIC | Age: 60
End: 2025-04-18
Payer: COMMERCIAL

## 2025-04-18 VITALS — HEIGHT: 62 IN | TEMPERATURE: 89 F | OXYGEN SATURATION: 98 % | WEIGHT: 181 LBS | BODY MASS INDEX: 33.31 KG/M2

## 2025-04-18 DIAGNOSIS — R30.0 DYSURIA: Primary | ICD-10-CM

## 2025-04-18 DIAGNOSIS — R31.9 HEMATURIA, UNSPECIFIED TYPE: ICD-10-CM

## 2025-04-18 LAB
BILIRUBIN, POC: NORMAL
BLOOD URINE, POC: NORMAL
CLARITY, POC: NORMAL
COLOR, POC: NORMAL
GLUCOSE URINE, POC: NORMAL MG/DL
KETONES, POC: NORMAL MG/DL
LEUKOCYTE EST, POC: NORMAL
NITRITE, POC: NORMAL
PH, POC: 6
PROTEIN, POC: NORMAL MG/DL
SPECIFIC GRAVITY, POC: 1.03
UROBILINOGEN, POC: 0.2 MG/DL

## 2025-04-18 PROCEDURE — 81003 URINALYSIS AUTO W/O SCOPE: CPT | Performed by: FAMILY MEDICINE

## 2025-04-18 PROCEDURE — 99211 OFF/OP EST MAY X REQ PHY/QHP: CPT | Performed by: FAMILY MEDICINE

## 2025-04-18 SDOH — ECONOMIC STABILITY: FOOD INSECURITY: WITHIN THE PAST 12 MONTHS, YOU WORRIED THAT YOUR FOOD WOULD RUN OUT BEFORE YOU GOT MONEY TO BUY MORE.: PATIENT DECLINED

## 2025-04-18 SDOH — ECONOMIC STABILITY: FOOD INSECURITY: WITHIN THE PAST 12 MONTHS, THE FOOD YOU BOUGHT JUST DIDN'T LAST AND YOU DIDN'T HAVE MONEY TO GET MORE.: PATIENT DECLINED

## 2025-04-18 ASSESSMENT — PATIENT HEALTH QUESTIONNAIRE - PHQ9
SUM OF ALL RESPONSES TO PHQ QUESTIONS 1-9: 0
SUM OF ALL RESPONSES TO PHQ QUESTIONS 1-9: 0
1. LITTLE INTEREST OR PLEASURE IN DOING THINGS: NOT AT ALL
SUM OF ALL RESPONSES TO PHQ QUESTIONS 1-9: 0
2. FEELING DOWN, DEPRESSED OR HOPELESS: NOT AT ALL
SUM OF ALL RESPONSES TO PHQ QUESTIONS 1-9: 0

## 2025-04-19 LAB
MICROORGANISM SPEC CULT: ABNORMAL
SERVICE CMNT-IMP: ABNORMAL
SPECIMEN DESCRIPTION: ABNORMAL

## 2025-04-21 ENCOUNTER — RESULTS FOLLOW-UP (OUTPATIENT)
Dept: FAMILY MEDICINE CLINIC | Age: 60
End: 2025-04-21

## 2025-04-21 RX ORDER — LEVOFLOXACIN 500 MG/1
500 TABLET, FILM COATED ORAL DAILY
Qty: 10 TABLET | Refills: 0 | Status: SHIPPED | OUTPATIENT
Start: 2025-04-21 | End: 2025-05-01

## 2025-04-22 ENCOUNTER — RESULTS FOLLOW-UP (OUTPATIENT)
Dept: FAMILY MEDICINE CLINIC | Age: 60
End: 2025-04-22

## 2025-05-02 ENCOUNTER — PREP FOR PROCEDURE (OUTPATIENT)
Dept: GASTROENTEROLOGY | Age: 60
End: 2025-05-02

## 2025-05-02 DIAGNOSIS — Z86.0100 HX OF COLONIC POLYP: ICD-10-CM

## 2025-05-19 ENCOUNTER — HOSPITAL ENCOUNTER (OUTPATIENT)
Age: 60
Setting detail: OUTPATIENT SURGERY
Discharge: HOME | End: 2025-05-19
Attending: INTERNAL MEDICINE | Admitting: INTERNAL MEDICINE
Payer: COMMERCIAL

## 2025-05-19 ENCOUNTER — ANESTHESIA (OUTPATIENT)
Dept: OPERATING ROOM | Age: 60
End: 2025-05-19
Payer: COMMERCIAL

## 2025-05-19 ENCOUNTER — ANESTHESIA EVENT (OUTPATIENT)
Dept: OPERATING ROOM | Age: 60
End: 2025-05-19
Payer: COMMERCIAL

## 2025-05-19 VITALS
SYSTOLIC BLOOD PRESSURE: 103 MMHG | RESPIRATION RATE: 15 BRPM | BODY MASS INDEX: 32.2 KG/M2 | HEIGHT: 62 IN | DIASTOLIC BLOOD PRESSURE: 69 MMHG | OXYGEN SATURATION: 97 % | HEART RATE: 76 BPM | WEIGHT: 175 LBS | TEMPERATURE: 98.1 F

## 2025-05-19 DIAGNOSIS — Z86.0100 HX OF COLONIC POLYP: ICD-10-CM

## 2025-05-19 LAB — GLUCOSE BLD-MCNC: 129 MG/DL (ref 65–105)

## 2025-05-19 PROCEDURE — 2580000003 HC RX 258: Performed by: ANESTHESIOLOGY

## 2025-05-19 PROCEDURE — 3700000001 HC ADD 15 MINUTES (ANESTHESIA): Performed by: INTERNAL MEDICINE

## 2025-05-19 PROCEDURE — 6360000002 HC RX W HCPCS: Performed by: NURSE ANESTHETIST, CERTIFIED REGISTERED

## 2025-05-19 PROCEDURE — 3700000000 HC ANESTHESIA ATTENDED CARE: Performed by: INTERNAL MEDICINE

## 2025-05-19 PROCEDURE — 3609010600 HC COLONOSCOPY POLYPECTOMY SNARE/COLD BIOPSY: Performed by: INTERNAL MEDICINE

## 2025-05-19 PROCEDURE — 82947 ASSAY GLUCOSE BLOOD QUANT: CPT

## 2025-05-19 PROCEDURE — 2709999900 HC NON-CHARGEABLE SUPPLY: Performed by: INTERNAL MEDICINE

## 2025-05-19 PROCEDURE — 7100000010 HC PHASE II RECOVERY - FIRST 15 MIN: Performed by: INTERNAL MEDICINE

## 2025-05-19 PROCEDURE — 7100000011 HC PHASE II RECOVERY - ADDTL 15 MIN: Performed by: INTERNAL MEDICINE

## 2025-05-19 PROCEDURE — 88305 TISSUE EXAM BY PATHOLOGIST: CPT

## 2025-05-19 RX ORDER — SODIUM CHLORIDE 0.9 % (FLUSH) 0.9 %
5-40 SYRINGE (ML) INJECTION PRN
Status: DISCONTINUED | OUTPATIENT
Start: 2025-05-19 | End: 2025-05-19 | Stop reason: HOSPADM

## 2025-05-19 RX ORDER — SODIUM CHLORIDE 9 MG/ML
INJECTION, SOLUTION INTRAVENOUS CONTINUOUS
Status: DISCONTINUED | OUTPATIENT
Start: 2025-05-19 | End: 2025-05-19 | Stop reason: HOSPADM

## 2025-05-19 RX ORDER — LIDOCAINE HYDROCHLORIDE 10 MG/ML
1 INJECTION, SOLUTION EPIDURAL; INFILTRATION; INTRACAUDAL; PERINEURAL
Status: DISCONTINUED | OUTPATIENT
Start: 2025-05-20 | End: 2025-05-19 | Stop reason: HOSPADM

## 2025-05-19 RX ORDER — PROPOFOL 10 MG/ML
INJECTION, EMULSION INTRAVENOUS
Status: DISCONTINUED | OUTPATIENT
Start: 2025-05-19 | End: 2025-05-19 | Stop reason: SDUPTHER

## 2025-05-19 RX ORDER — SODIUM CHLORIDE 0.9 % (FLUSH) 0.9 %
5-40 SYRINGE (ML) INJECTION EVERY 12 HOURS SCHEDULED
Status: DISCONTINUED | OUTPATIENT
Start: 2025-05-19 | End: 2025-05-19 | Stop reason: HOSPADM

## 2025-05-19 RX ORDER — SODIUM CHLORIDE, SODIUM LACTATE, POTASSIUM CHLORIDE, CALCIUM CHLORIDE 600; 310; 30; 20 MG/100ML; MG/100ML; MG/100ML; MG/100ML
INJECTION, SOLUTION INTRAVENOUS CONTINUOUS
Status: DISCONTINUED | OUTPATIENT
Start: 2025-05-19 | End: 2025-05-19 | Stop reason: HOSPADM

## 2025-05-19 RX ORDER — SODIUM CHLORIDE 9 MG/ML
INJECTION, SOLUTION INTRAVENOUS PRN
Status: DISCONTINUED | OUTPATIENT
Start: 2025-05-19 | End: 2025-05-19 | Stop reason: HOSPADM

## 2025-05-19 RX ADMIN — PROPOFOL 50 MG: 10 INJECTION, EMULSION INTRAVENOUS at 12:29

## 2025-05-19 RX ADMIN — PROPOFOL 70 MG: 10 INJECTION, EMULSION INTRAVENOUS at 12:16

## 2025-05-19 RX ADMIN — PROPOFOL 50 MG: 10 INJECTION, EMULSION INTRAVENOUS at 12:23

## 2025-05-19 RX ADMIN — PROPOFOL 30 MG: 10 INJECTION, EMULSION INTRAVENOUS at 12:17

## 2025-05-19 RX ADMIN — SODIUM CHLORIDE, SODIUM LACTATE, POTASSIUM CHLORIDE, AND CALCIUM CHLORIDE: .6; .31; .03; .02 INJECTION, SOLUTION INTRAVENOUS at 11:21

## 2025-05-19 ASSESSMENT — PAIN SCALES - GENERAL: PAINLEVEL_OUTOF10: 0

## 2025-05-19 ASSESSMENT — PAIN - FUNCTIONAL ASSESSMENT: PAIN_FUNCTIONAL_ASSESSMENT: NONE - DENIES PAIN

## 2025-05-19 ASSESSMENT — ENCOUNTER SYMPTOMS
WHEEZING: 0
BACK PAIN: 0
TROUBLE SWALLOWING: 0
APNEA: 0
SINUS PRESSURE: 0
SHORTNESS OF BREATH: 0
CHEST TIGHTNESS: 0
SORE THROAT: 0
RHINORRHEA: 0
COUGH: 0

## 2025-05-19 NOTE — ANESTHESIA POSTPROCEDURE EVALUATION
Department of Anesthesiology  Postprocedure Note    Patient: Robyn Oropeza  MRN: [id number]  YOB: 1965  Date of evaluation: 5/19/2025    Procedure Summary       Date: 05/19/25 Room / Location: [address]    Anesthesia Start: 1211 Anesthesia Stop: 1243    Procedure: COLONOSCOPY COLD FORCEP BIOPSIES/HOT SNARE POLYPECTOMY Diagnosis:       Hx of colonic polyp      (Hx of colonic polyp [Z86.0100])    Surgeons: Ayad Alas MD Responsible Provider: Amarjit Arroyo DO    Anesthesia Type: MAC ASA Status: 3            Anesthesia Type: MAC    Sherry Phase I:      Sherry Phase II:      Anesthesia Post Evaluation    Patient location during evaluation: PACU  Patient participation: complete - patient participated  Level of consciousness: awake and alert  Airway patency: patent  Nausea & Vomiting: no nausea and no vomiting  Cardiovascular status: hemodynamically stable  Respiratory status: acceptable  Hydration status: stable  Pain management: adequate    No notable events documented.

## 2025-05-19 NOTE — ANESTHESIA PRE PROCEDURE
found for: \"PROTIME\", \"INR\", \"APTT\"    HCG (If Applicable): No results found for: \"PREGTESTUR\", \"PREGSERUM\", \"HCG\", \"HCGQUANT\"     ABGs: No results found for: \"PHART\", \"PO2ART\", \"FTI1UXX\", \"XFO9NRE\", \"BEART\", \"I6IGVTRJ\"     Type & Screen (If Applicable):  No results found for: \"ABORH\", \"LABANTI\"    Drug/Infectious Status (If Applicable):  No results found for: \"HIV\", \"HEPCAB\"    COVID-19 Screening (If Applicable): No results found for: \"COVID19\"        Anesthesia Evaluation  Patient summary reviewed and Nursing notes reviewed   no history of anesthetic complications:   Airway: Mallampati: II  TM distance: >3 FB   Neck ROM: full  Mouth opening: > = 3 FB   Dental:          Pulmonary:   (+)  COPD:          asthma:                            Cardiovascular:    (+) hypertension:, CHF:    (-) CAD and CABG/stent        Rate: normal                    Neuro/Psych:               GI/Hepatic/Renal:             Endo/Other:    (+) Diabetes.                 Abdominal:             Vascular:          Other Findings:             Anesthesia Plan      general and TIVA     ASA 3       Induction: intravenous.    MIPS: prophylactic pharmacologic antiemetic agents not administered perioperatively for documented reasons.  Anesthetic plan and risks discussed with patient.      Plan discussed with CRNA.                    Amarjit Arroyo DO   5/19/2025

## 2025-05-19 NOTE — H&P
History and Physical Service   [facility]    HISTORY AND PHYSICAL EXAMINATION            Date of Evaluation: 2025  Patient name:  Robyn Oropeza  MRN:   [id number]  YOB: 1965  PCP:    Rafaela Barajas DO    History Obtained From:     Patient, medical records    History of Present Illness:     This is Robyn riggins 60 y.o. female who presents today for a COLONOSCOPY DIAGNOSTIC by Ayad Alas MD for Hx of colonic polyp. Patient denies bowel changes. She denies bloody tarry stools, diarrhea alternating with constipation, nausea, vomiting, abdominal pain or unintentional weight loss. Patient followed bowel prep until watery yellow. Last colonoscopy with EMR 2024 - several polyps removed. Patient was advised to undergo 6 month follow-up colonoscopy. No FH colon cancer. Known diabetes, POC . Last Trulicity over a week ago. Denies any current blood thinning medications.     Past Medical History:     Past Medical History:   Diagnosis Date    Arthritis     CHF (congestive heart failure) (HCC)     history of    COPD (chronic obstructive pulmonary disease) (HCC)     DM (diabetes mellitus) (HCC)     Hyperlipidemia     Hypertension         Past Surgical History:     Past Surgical History:   Procedure Laterality Date    BLADDER SURGERY       SECTION      x2    COLONOSCOPY N/A 2024    COLONOSCOPY DIAGNOSTIC COLD SNARE AND COLD FORCEPS POLYPECTOMY, HOT SNARE POLYPECTOMY performed by Ayad Alas MD at [facility] OR    COLONOSCOPY N/A 2024    COLONOSCOPY COLD FORCEPS POLYPECTOMY ASCENDING COLON EMR TATOOING performed by Ayad Alas MD at [facility] OR    COLONOSCOPY N/A 2024    COLONOSCOPY TATTOOING performed by Ayad Alas MD at [facility] OR    TONSILLECTOMY          Medications Prior to Admission:     Prior to Admission medications    Medication Sig Start Date End Date Taking? Authorizing Provider   bisacodyl 5 MG EC

## 2025-05-19 NOTE — DISCHARGE INSTRUCTIONS
[facility]    POST-ENDOSCOPY INSTRUCTIONS    1. ACTIVITY   No driving, operating machinery, or making important decisions for 24 hours.    Resume normal activity after 24 hours.  You may return to work after 24 hours.    2. DIET        Resume your usual diet unless specified below.   ***    3. MEDICATIONS    Resume your usual medications.     Do not consume alcohol, tranquilizers, or sleeping medications for 24 hour unless advised by your physician.                 4. PHYSICIAN FOLLOW-UP / INSTRUCTIONS    Please call the office/clinic in 10 days for biopsy results:      [  ] GI office:  [phone] option #2          Follow up with your family physician as planned.    6. NORMAL CHANGES YOU MAY EXPERIENCE AFTER ENDOSCOPY:       COLONOSCOPY    Passing of gas for several hours.    Some mild abdominal cramping.                You may feel fatigued for the next 24-48   hours due to the prep and sedation    7. CALL YOUR PHYSICIAN IF YOU EXPERIENCE ANY OF THE FOLLOWING      A.  Passing blood rectally or vomiting blood (color may be red or black)      B.  Severe abdominal pain or tenderness (that is not relieved by passing air)      C.  Fever, chills, or excessive sweating      D.  Persistent nausea or vomiting      E.  Redness or swelling at the IV site    If you have additional questions, PLEASE call your doctor or the [facility] GI Unit ([phone] option #2)

## 2025-05-19 NOTE — OP NOTE
Operative Note      Patient: Robyn Oropeza  YOB: 1965  MRN: [id number]    Date of Procedure: 5/19/2025    Pre-Op Diagnosis Codes:      * Hx of colonic polyp [Z86.0100]    Post-Op Diagnosis:  Post ectomy scar ascending colon, ascending colon polyp, sigmoid diverticulosis internal hemorrhoids       Procedure(s):  COLONOSCOPY COLD FORCEP BIOPSIES/HOT SNARE POLYPECTOMY    Surgeon(s):  Ayad Alas MD    Assistant:   * No surgical staff found *    Anesthesia: Monitor Anesthesia Care    Estimated Blood Loss (mL): Minimal    Complications: None    Specimens:   ID Type Source Tests Collected by Time Destination   A : ASCENDING COLON POLYP Tissue Colon-Ascending SURGICAL PATHOLOGY Ayad Alas MD 5/19/2025 1226    B : post polypectomy scar ascending colon bx Tissue Colon-Ascending SURGICAL PATHOLOGY Ayad Alas MD 5/19/2025 1225        Implants:  * No implants in log *      Drains: * No LDAs found *    Findings:  Infection Present At Time Of Surgery (PATOS) (choose all levels that have infection present):  No infection present        Scope withdrawal time: 14 min     Description of Procedure:  Informed consent was obtained from the patient after explanation of the procedure including indications, description of the procedure,  benefits and possible risks and complications of the procedure, and alternatives. Questions were answered.  The patient's history was reviewed and a directed physical examination was performed prior to the procedure.    Patient was monitored throughout the procedure with pulse oximetry and periodic assessment of vital signs. Patient was sedated as noted above. With the patient initially in the left lateral decubitus position, a digital rectal examination was performed and revealed negative without mass, lesions or tenderness.  The Olympus video colonoscope was placed in the patient's rectum and advanced without difficulty  to the cecum, which was

## 2025-05-20 LAB — SURGICAL PATHOLOGY REPORT: NORMAL

## 2025-05-21 ENCOUNTER — TELEPHONE (OUTPATIENT)
Dept: GASTROENTEROLOGY | Age: 60
End: 2025-05-21

## 2025-05-21 NOTE — TELEPHONE ENCOUNTER
----- Message from Dr. Ayad Alas MD sent at 5/19/2025 12:45 PM EDT -----  Repeat colonoscopy in 2 years    2 year colon recall encountered

## 2025-05-30 ENCOUNTER — PATIENT MESSAGE (OUTPATIENT)
Dept: GASTROENTEROLOGY | Age: 60
End: 2025-05-30

## 2025-06-23 ENCOUNTER — TELEPHONE (OUTPATIENT)
Dept: FAMILY MEDICINE CLINIC | Age: 60
End: 2025-06-23

## 2025-06-23 DIAGNOSIS — Z12.31 SCREENING MAMMOGRAM FOR BREAST CANCER: Primary | ICD-10-CM

## 2025-06-30 ENCOUNTER — PATIENT MESSAGE (OUTPATIENT)
Dept: FAMILY MEDICINE CLINIC | Age: 60
End: 2025-06-30

## 2025-06-30 DIAGNOSIS — N18.1 CRF (CHRONIC RENAL FAILURE), STAGE 1: ICD-10-CM

## 2025-06-30 DIAGNOSIS — J45.30 MILD PERSISTENT ASTHMA WITHOUT COMPLICATION: ICD-10-CM

## 2025-06-30 DIAGNOSIS — I10 ESSENTIAL HYPERTENSION: ICD-10-CM

## 2025-06-30 DIAGNOSIS — J30.1 SEASONAL ALLERGIC RHINITIS DUE TO POLLEN: ICD-10-CM

## 2025-06-30 RX ORDER — SPIRONOLACTONE 25 MG/1
TABLET ORAL
Qty: 90 TABLET | Refills: 0 | Status: SHIPPED | OUTPATIENT
Start: 2025-06-30

## 2025-06-30 RX ORDER — LEVOCETIRIZINE DIHYDROCHLORIDE 5 MG/1
5 TABLET, FILM COATED ORAL DAILY
Qty: 90 TABLET | Refills: 3 | Status: SHIPPED | OUTPATIENT
Start: 2025-06-30

## 2025-06-30 NOTE — TELEPHONE ENCOUNTER
Robyn Oropeza is calling to request a refill on the following medication(s):    Last Visit Date (If Applicable):  4/17/2025    Next Visit Date:    Visit date not found    Medication Request:  Requested Prescriptions     Pending Prescriptions Disp Refills    Dulaglutide 4.5 MG/0.5ML SOAJ 2 mL 0     Sig: Inject 4.5 mg into the skin every 7 days

## 2025-06-30 NOTE — TELEPHONE ENCOUNTER
Robyn Oropeza is calling to request a refill on the following medication(s):    Last Visit Date (If Applicable):  4/17/2025    Next Visit Date:    Visit date not found    Medication Request:  Requested Prescriptions     Pending Prescriptions Disp Refills    spironolactone (ALDACTONE) 25 MG tablet 90 tablet 0     Sig: Take 1 tablet by mouth daily.

## 2025-06-30 NOTE — TELEPHONE ENCOUNTER
Robyn Oropeza is calling to request a refill on the following medication(s):    Last Visit Date (If Applicable):  4/17/2025    Next Visit Date:    Visit date not found    Medication Request:  Requested Prescriptions     Pending Prescriptions Disp Refills    levocetirizine (XYZAL) 5 MG tablet 90 tablet 3     Sig: Take 1 tablet by mouth daily

## 2025-07-08 DIAGNOSIS — Z12.31 SCREENING MAMMOGRAM FOR BREAST CANCER: ICD-10-CM

## 2025-08-19 ENCOUNTER — PATIENT MESSAGE (OUTPATIENT)
Dept: FAMILY MEDICINE CLINIC | Age: 60
End: 2025-08-19

## (undated) DEVICE — SNARE ENDO CAPTIVATOR W10MM X L240CM RND INSUL STIFF-UOM BOX OF 10

## (undated) DEVICE — SNARE ENDOSCP L240CM W15MM SHTH DIA2.4MM CHN 2.8MM STIFF

## (undated) DEVICE — TRAP SPEC POLYP REM STRNR CLN DSGN MAGNIFYING WIND DISP

## (undated) DEVICE — TRAP POLYP ETRAP

## (undated) DEVICE — FORCEPS BX L240CM WRK CHN 2.8MM STD CAP W/ NDL MIC MESH

## (undated) DEVICE — FORCEPS BX L240CM JAW DIA2.4MM ORNG L CAP W/ NDL DISP RAD

## (undated) DEVICE — ELECTRODE PT RET AD L9FT HI MOIST COND ADH HYDRGEL CORDED

## (undated) DEVICE — SYRINGE MED 50ML LUERSLIP TIP

## (undated) DEVICE — ERBE NESSY®PLATE 170 SPLIT; 168CM²; CABLE 3M: Brand: ERBE

## (undated) DEVICE — STAZ ENDO KIT: Brand: MEDLINE INDUSTRIES, INC.

## (undated) DEVICE — CATHETER SCLERO L240CM NDL 25GA L4MM SHTH DIA2.3MM CNTRST

## (undated) DEVICE — FORCEPS BX L240CM JAW DIA22MM ORNG STD CAP W NDL RAD JAW 4